# Patient Record
Sex: FEMALE | Race: BLACK OR AFRICAN AMERICAN | NOT HISPANIC OR LATINO | Employment: OTHER | URBAN - METROPOLITAN AREA
[De-identification: names, ages, dates, MRNs, and addresses within clinical notes are randomized per-mention and may not be internally consistent; named-entity substitution may affect disease eponyms.]

---

## 2022-11-09 ENCOUNTER — HOSPITAL ENCOUNTER (INPATIENT)
Facility: HOSPITAL | Age: 72
LOS: 8 days | Discharge: NURSING, REHAB OR LONG-TERM CARE | DRG: 481 | End: 2022-11-17
Attending: EMERGENCY MEDICINE | Admitting: FAMILY MEDICINE
Payer: MEDICARE

## 2022-11-09 DIAGNOSIS — Z01.818 PRE-OP EVALUATION: ICD-10-CM

## 2022-11-09 DIAGNOSIS — S52.502A CLOSED FRACTURE OF DISTAL END OF LEFT RADIUS, UNSPECIFIED FRACTURE MORPHOLOGY, INITIAL ENCOUNTER: ICD-10-CM

## 2022-11-09 DIAGNOSIS — S72.145A CLOSED NONDISPLACED INTERTROCHANTERIC FRACTURE OF LEFT FEMUR, INITIAL ENCOUNTER: ICD-10-CM

## 2022-11-09 DIAGNOSIS — Z01.818 PRE-OP TESTING: ICD-10-CM

## 2022-11-09 DIAGNOSIS — R07.9 CHEST PAIN: ICD-10-CM

## 2022-11-09 DIAGNOSIS — S72.145D CLOSED NONDISPLACED INTERTROCHANTERIC FRACTURE OF LEFT FEMUR WITH ROUTINE HEALING, SUBSEQUENT ENCOUNTER: ICD-10-CM

## 2022-11-09 DIAGNOSIS — S72.142D CLOSED DISPLACED INTERTROCHANTERIC FRACTURE OF LEFT FEMUR WITH ROUTINE HEALING, SUBSEQUENT ENCOUNTER: Primary | ICD-10-CM

## 2022-11-09 DIAGNOSIS — S72.002A CLOSED FRACTURE OF LEFT HIP, INITIAL ENCOUNTER: ICD-10-CM
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
## 2022-11-09 LAB
ALBUMIN SERPL BCP-MCNC: 4.1 G/DL (ref 3.5–5.2)
ALP SERPL-CCNC: 75 U/L (ref 55–135)
ALT SERPL W/O P-5'-P-CCNC: 8 U/L (ref 10–44)
ANION GAP SERPL CALC-SCNC: 12 MMOL/L (ref 8–16)
APTT BLDCRRT: 22 SEC (ref 21–32)
AST SERPL-CCNC: 19 U/L (ref 10–40)
BASOPHILS # BLD AUTO: 0.02 K/UL (ref 0–0.2)
BASOPHILS NFR BLD: 0.2 % (ref 0–1.9)
BILIRUB SERPL-MCNC: 0.5 MG/DL (ref 0.1–1)
BUN SERPL-MCNC: 11 MG/DL (ref 8–23)
CALCIUM SERPL-MCNC: 9.1 MG/DL (ref 8.7–10.5)
CHLORIDE SERPL-SCNC: 110 MMOL/L (ref 95–110)
CO2 SERPL-SCNC: 16 MMOL/L (ref 23–29)
CREAT SERPL-MCNC: 0.7 MG/DL (ref 0.5–1.4)
DIFFERENTIAL METHOD: ABNORMAL
EOSINOPHIL # BLD AUTO: 0 K/UL (ref 0–0.5)
EOSINOPHIL NFR BLD: 0 % (ref 0–8)
ERYTHROCYTE [DISTWIDTH] IN BLOOD BY AUTOMATED COUNT: 19.3 % (ref 11.5–14.5)
EST. GFR  (NO RACE VARIABLE): >60 ML/MIN/1.73 M^2
GLUCOSE SERPL-MCNC: 108 MG/DL (ref 70–110)
HCT VFR BLD AUTO: 29.7 % (ref 37–48.5)
HGB BLD-MCNC: 9.1 G/DL (ref 12–16)
IMM GRANULOCYTES # BLD AUTO: 0.03 K/UL (ref 0–0.04)
IMM GRANULOCYTES NFR BLD AUTO: 0.3 % (ref 0–0.5)
INR PPP: 1 (ref 0.8–1.2)
LYMPHOCYTES # BLD AUTO: 1.5 K/UL (ref 1–4.8)
LYMPHOCYTES NFR BLD: 13.5 % (ref 18–48)
MCH RBC QN AUTO: 22.1 PG (ref 27–31)
MCHC RBC AUTO-ENTMCNC: 30.6 G/DL (ref 32–36)
MCV RBC AUTO: 72 FL (ref 82–98)
MONOCYTES # BLD AUTO: 0.7 K/UL (ref 0.3–1)
MONOCYTES NFR BLD: 6.2 % (ref 4–15)
NEUTROPHILS # BLD AUTO: 8.6 K/UL (ref 1.8–7.7)
NEUTROPHILS NFR BLD: 79.8 % (ref 38–73)
NRBC BLD-RTO: 0 /100 WBC
PLATELET # BLD AUTO: 270 K/UL (ref 150–450)
PMV BLD AUTO: 11.4 FL (ref 9.2–12.9)
POTASSIUM SERPL-SCNC: 4.1 MMOL/L (ref 3.5–5.1)
PROT SERPL-MCNC: 7.1 G/DL (ref 6–8.4)
PROTHROMBIN TIME: 10.3 SEC (ref 9–12.5)
RBC # BLD AUTO: 4.12 M/UL (ref 4–5.4)
SODIUM SERPL-SCNC: 138 MMOL/L (ref 136–145)
WBC # BLD AUTO: 10.74 K/UL (ref 3.9–12.7)

## 2022-11-09 PROCEDURE — 25000003 PHARM REV CODE 250: Performed by: FAMILY MEDICINE

## 2022-11-09 PROCEDURE — 85025 COMPLETE CBC W/AUTO DIFF WBC: CPT | Performed by: EMERGENCY MEDICINE

## 2022-11-09 PROCEDURE — 93010 ELECTROCARDIOGRAM REPORT: CPT | Mod: ,,, | Performed by: INTERNAL MEDICINE

## 2022-11-09 PROCEDURE — 11000001 HC ACUTE MED/SURG PRIVATE ROOM

## 2022-11-09 PROCEDURE — 85610 PROTHROMBIN TIME: CPT | Performed by: FAMILY MEDICINE

## 2022-11-09 PROCEDURE — 63600175 PHARM REV CODE 636 W HCPCS: Performed by: EMERGENCY MEDICINE

## 2022-11-09 PROCEDURE — 80053 COMPREHEN METABOLIC PANEL: CPT | Performed by: EMERGENCY MEDICINE

## 2022-11-09 PROCEDURE — 93010 EKG 12-LEAD: ICD-10-PCS | Mod: ,,, | Performed by: INTERNAL MEDICINE

## 2022-11-09 PROCEDURE — P9612 CATHETERIZE FOR URINE SPEC: HCPCS

## 2022-11-09 PROCEDURE — 99285 EMERGENCY DEPT VISIT HI MDM: CPT | Mod: 25

## 2022-11-09 PROCEDURE — 93005 ELECTROCARDIOGRAM TRACING: CPT

## 2022-11-09 PROCEDURE — 85730 THROMBOPLASTIN TIME PARTIAL: CPT | Performed by: FAMILY MEDICINE

## 2022-11-09 PROCEDURE — 86850 RBC ANTIBODY SCREEN: CPT | Performed by: ORTHOPAEDIC SURGERY

## 2022-11-09 PROCEDURE — 25000003 PHARM REV CODE 250: Performed by: EMERGENCY MEDICINE

## 2022-11-09 RX ORDER — HYDROCODONE BITARTRATE AND ACETAMINOPHEN 5; 325 MG/1; MG/1
1 TABLET ORAL
Status: COMPLETED | OUTPATIENT
Start: 2022-11-09 | End: 2022-11-09

## 2022-11-09 RX ORDER — SODIUM CHLORIDE 9 MG/ML
INJECTION, SOLUTION INTRAVENOUS CONTINUOUS
Status: DISCONTINUED | OUTPATIENT
Start: 2022-11-09 | End: 2022-11-14

## 2022-11-09 RX ORDER — ACETAMINOPHEN 325 MG/1
650 TABLET ORAL EVERY 6 HOURS PRN
Status: DISCONTINUED | OUTPATIENT
Start: 2022-11-09 | End: 2022-11-10

## 2022-11-09 RX ORDER — MORPHINE SULFATE 4 MG/ML
4 INJECTION, SOLUTION INTRAMUSCULAR; INTRAVENOUS EVERY 6 HOURS PRN
Status: DISCONTINUED | OUTPATIENT
Start: 2022-11-09 | End: 2022-11-10

## 2022-11-09 RX ORDER — HYDROCODONE BITARTRATE AND ACETAMINOPHEN 7.5; 325 MG/1; MG/1
1 TABLET ORAL EVERY 6 HOURS PRN
Status: DISCONTINUED | OUTPATIENT
Start: 2022-11-09 | End: 2022-11-10

## 2022-11-09 RX ORDER — MORPHINE SULFATE 4 MG/ML
4 INJECTION, SOLUTION INTRAMUSCULAR; INTRAVENOUS
Status: COMPLETED | OUTPATIENT
Start: 2022-11-09 | End: 2022-11-09

## 2022-11-09 RX ORDER — POLYETHYLENE GLYCOL 3350 17 G/17G
17 POWDER, FOR SOLUTION ORAL 2 TIMES DAILY
Status: DISCONTINUED | OUTPATIENT
Start: 2022-11-09 | End: 2022-11-18 | Stop reason: HOSPADM

## 2022-11-09 RX ADMIN — SODIUM CHLORIDE: 0.9 INJECTION, SOLUTION INTRAVENOUS at 11:11

## 2022-11-09 RX ADMIN — POLYETHYLENE GLYCOL 3350 17 G: 17 POWDER, FOR SOLUTION ORAL at 11:11

## 2022-11-09 RX ADMIN — MORPHINE SULFATE 4 MG: 4 INJECTION INTRAVENOUS at 10:11

## 2022-11-09 RX ADMIN — HYDROCODONE BITARTRATE AND ACETAMINOPHEN 1 TABLET: 5; 325 TABLET ORAL at 09:11

## 2022-11-10 ENCOUNTER — ANESTHESIA EVENT (OUTPATIENT)
Dept: SURGERY | Facility: HOSPITAL | Age: 72
DRG: 481 | End: 2022-11-10
Payer: MEDICARE

## 2022-11-10 ENCOUNTER — ANESTHESIA (OUTPATIENT)
Dept: SURGERY | Facility: HOSPITAL | Age: 72
DRG: 481 | End: 2022-11-10
Payer: MEDICARE

## 2022-11-10 PROBLEM — F41.1 GAD (GENERALIZED ANXIETY DISORDER): Status: ACTIVE | Noted: 2022-11-10

## 2022-11-10 PROBLEM — S52.502A CLOSED FRACTURE OF DISTAL END OF LEFT RADIUS: Status: ACTIVE | Noted: 2022-11-10

## 2022-11-10 PROBLEM — S72.145A CLOSED NONDISPLACED INTERTROCHANTERIC FRACTURE OF LEFT FEMUR: Status: ACTIVE | Noted: 2022-11-10

## 2022-11-10 LAB
ABO + RH BLD: NORMAL
BILIRUB UR QL STRIP: NEGATIVE
BLD GP AB SCN CELLS X3 SERPL QL: NORMAL
CLARITY UR: CLEAR
COLOR UR: YELLOW
GLUCOSE UR QL STRIP: NEGATIVE
HGB UR QL STRIP: NEGATIVE
KETONES UR QL STRIP: ABNORMAL
LEUKOCYTE ESTERASE UR QL STRIP: NEGATIVE
NITRITE UR QL STRIP: NEGATIVE
PH UR STRIP: 6 [PH] (ref 5–8)
PROT UR QL STRIP: NEGATIVE
SP GR UR STRIP: 1.02 (ref 1–1.03)
URN SPEC COLLECT METH UR: ABNORMAL
UROBILINOGEN UR STRIP-ACNC: NEGATIVE EU/DL

## 2022-11-10 PROCEDURE — 25000003 PHARM REV CODE 250: Performed by: NURSE PRACTITIONER

## 2022-11-10 PROCEDURE — 25000003 PHARM REV CODE 250: Performed by: FAMILY MEDICINE

## 2022-11-10 PROCEDURE — 36000710: Performed by: ORTHOPAEDIC SURGERY

## 2022-11-10 PROCEDURE — 99223 PR INITIAL HOSPITAL CARE,LEVL III: ICD-10-PCS | Mod: 57,,, | Performed by: PHYSICIAN ASSISTANT

## 2022-11-10 PROCEDURE — 71000039 HC RECOVERY, EACH ADD'L HOUR: Performed by: ORTHOPAEDIC SURGERY

## 2022-11-10 PROCEDURE — 36415 COLL VENOUS BLD VENIPUNCTURE: CPT | Performed by: FAMILY MEDICINE

## 2022-11-10 PROCEDURE — 25000003 PHARM REV CODE 250: Performed by: ORTHOPAEDIC SURGERY

## 2022-11-10 PROCEDURE — 27245 TREAT THIGH FRACTURE: CPT | Mod: LT,,, | Performed by: ORTHOPAEDIC SURGERY

## 2022-11-10 PROCEDURE — 99223 1ST HOSP IP/OBS HIGH 75: CPT | Mod: 57,,, | Performed by: PHYSICIAN ASSISTANT

## 2022-11-10 PROCEDURE — 21400001 HC TELEMETRY ROOM

## 2022-11-10 PROCEDURE — 25000003 PHARM REV CODE 250: Performed by: STUDENT IN AN ORGANIZED HEALTH CARE EDUCATION/TRAINING PROGRAM

## 2022-11-10 PROCEDURE — 27245 TREAT THIGH FRACTURE: CPT | Mod: AS,LT,, | Performed by: PHYSICIAN ASSISTANT

## 2022-11-10 PROCEDURE — 71000033 HC RECOVERY, INTIAL HOUR: Performed by: ORTHOPAEDIC SURGERY

## 2022-11-10 PROCEDURE — 63600175 PHARM REV CODE 636 W HCPCS: Performed by: PHYSICIAN ASSISTANT

## 2022-11-10 PROCEDURE — 27201423 OPTIME MED/SURG SUP & DEVICES STERILE SUPPLY: Performed by: ORTHOPAEDIC SURGERY

## 2022-11-10 PROCEDURE — 81003 URINALYSIS AUTO W/O SCOPE: CPT | Performed by: EMERGENCY MEDICINE

## 2022-11-10 PROCEDURE — 37000009 HC ANESTHESIA EA ADD 15 MINS: Performed by: ORTHOPAEDIC SURGERY

## 2022-11-10 PROCEDURE — 27245 PR OPEN FIX INTER/SUBTROCH FX,IMPLNT: ICD-10-PCS | Mod: AS,LT,, | Performed by: PHYSICIAN ASSISTANT

## 2022-11-10 PROCEDURE — C1713 ANCHOR/SCREW BN/BN,TIS/BN: HCPCS | Performed by: ORTHOPAEDIC SURGERY

## 2022-11-10 PROCEDURE — 63600175 PHARM REV CODE 636 W HCPCS: Performed by: FAMILY MEDICINE

## 2022-11-10 PROCEDURE — 27245 PR OPEN FIX INTER/SUBTROCH FX,IMPLNT: ICD-10-PCS | Mod: LT,,, | Performed by: ORTHOPAEDIC SURGERY

## 2022-11-10 PROCEDURE — 25000003 PHARM REV CODE 250: Performed by: PHYSICIAN ASSISTANT

## 2022-11-10 PROCEDURE — C1769 GUIDE WIRE: HCPCS | Performed by: ORTHOPAEDIC SURGERY

## 2022-11-10 PROCEDURE — 63600175 PHARM REV CODE 636 W HCPCS: Performed by: NURSE ANESTHETIST, CERTIFIED REGISTERED

## 2022-11-10 PROCEDURE — 25000003 PHARM REV CODE 250: Performed by: NURSE ANESTHETIST, CERTIFIED REGISTERED

## 2022-11-10 PROCEDURE — 37000008 HC ANESTHESIA 1ST 15 MINUTES: Performed by: ORTHOPAEDIC SURGERY

## 2022-11-10 PROCEDURE — 36000711: Performed by: ORTHOPAEDIC SURGERY

## 2022-11-10 PROCEDURE — 63600175 PHARM REV CODE 636 W HCPCS: Performed by: NURSE PRACTITIONER

## 2022-11-10 RX ORDER — MORPHINE SULFATE 4 MG/ML
4 INJECTION, SOLUTION INTRAMUSCULAR; INTRAVENOUS
Status: DISCONTINUED | OUTPATIENT
Start: 2022-11-10 | End: 2022-11-10

## 2022-11-10 RX ORDER — BUSPIRONE HYDROCHLORIDE 10 MG/1
10 TABLET ORAL 3 TIMES DAILY
Status: DISCONTINUED | OUTPATIENT
Start: 2022-11-10 | End: 2022-11-18 | Stop reason: HOSPADM

## 2022-11-10 RX ORDER — SODIUM CHLORIDE 0.9 % (FLUSH) 0.9 %
10 SYRINGE (ML) INJECTION
Status: DISCONTINUED | OUTPATIENT
Start: 2022-11-10 | End: 2022-11-18 | Stop reason: HOSPADM

## 2022-11-10 RX ORDER — SODIUM,POTASSIUM PHOSPHATES 280-250MG
2 POWDER IN PACKET (EA) ORAL
Status: DISCONTINUED | OUTPATIENT
Start: 2022-11-10 | End: 2022-11-18 | Stop reason: HOSPADM

## 2022-11-10 RX ORDER — OXYCODONE AND ACETAMINOPHEN 5; 325 MG/1; MG/1
1 TABLET ORAL
Status: DISCONTINUED | OUTPATIENT
Start: 2022-11-10 | End: 2022-11-10 | Stop reason: HOSPADM

## 2022-11-10 RX ORDER — SUCCINYLCHOLINE CHLORIDE 20 MG/ML
INJECTION INTRAMUSCULAR; INTRAVENOUS
Status: DISCONTINUED | OUTPATIENT
Start: 2022-11-10 | End: 2022-11-10

## 2022-11-10 RX ORDER — ACETAMINOPHEN 325 MG/1
650 TABLET ORAL EVERY 4 HOURS PRN
Status: DISCONTINUED | OUTPATIENT
Start: 2022-11-10 | End: 2022-11-18 | Stop reason: HOSPADM

## 2022-11-10 RX ORDER — ROCURONIUM BROMIDE 10 MG/ML
INJECTION, SOLUTION INTRAVENOUS
Status: DISCONTINUED | OUTPATIENT
Start: 2022-11-10 | End: 2022-11-10

## 2022-11-10 RX ORDER — KETOROLAC TROMETHAMINE 30 MG/ML
15 INJECTION, SOLUTION INTRAMUSCULAR; INTRAVENOUS EVERY 8 HOURS PRN
Status: DISCONTINUED | OUTPATIENT
Start: 2022-11-10 | End: 2022-11-10 | Stop reason: HOSPADM

## 2022-11-10 RX ORDER — HYDROCODONE BITARTRATE AND ACETAMINOPHEN 5; 325 MG/1; MG/1
1 TABLET ORAL EVERY 4 HOURS PRN
Status: DISCONTINUED | OUTPATIENT
Start: 2022-11-10 | End: 2022-11-18 | Stop reason: HOSPADM

## 2022-11-10 RX ORDER — ONDANSETRON 8 MG/1
8 TABLET, ORALLY DISINTEGRATING ORAL EVERY 8 HOURS PRN
Status: DISCONTINUED | OUTPATIENT
Start: 2022-11-10 | End: 2022-11-18 | Stop reason: HOSPADM

## 2022-11-10 RX ORDER — MUPIROCIN 20 MG/G
OINTMENT TOPICAL 2 TIMES DAILY
Status: COMPLETED | OUTPATIENT
Start: 2022-11-10 | End: 2022-11-15

## 2022-11-10 RX ORDER — MIRTAZAPINE 30 MG/1
30 TABLET, FILM COATED ORAL NIGHTLY
COMMUNITY
Start: 2022-06-23 | End: 2023-01-09 | Stop reason: SDUPTHER

## 2022-11-10 RX ORDER — MORPHINE SULFATE 4 MG/ML
2 INJECTION, SOLUTION INTRAMUSCULAR; INTRAVENOUS
Status: DISCONTINUED | OUTPATIENT
Start: 2022-11-10 | End: 2022-11-18 | Stop reason: HOSPADM

## 2022-11-10 RX ORDER — DIPHENHYDRAMINE HCL 25 MG
25 CAPSULE ORAL EVERY 6 HOURS PRN
Status: DISCONTINUED | OUTPATIENT
Start: 2022-11-10 | End: 2022-11-18 | Stop reason: HOSPADM

## 2022-11-10 RX ORDER — MAG HYDROX/ALUMINUM HYD/SIMETH 200-200-20
30 SUSPENSION, ORAL (FINAL DOSE FORM) ORAL 4 TIMES DAILY PRN
Status: DISCONTINUED | OUTPATIENT
Start: 2022-11-10 | End: 2022-11-18 | Stop reason: HOSPADM

## 2022-11-10 RX ORDER — PROMETHAZINE HYDROCHLORIDE 25 MG/1
25 TABLET ORAL EVERY 6 HOURS PRN
Status: DISCONTINUED | OUTPATIENT
Start: 2022-11-10 | End: 2022-11-18 | Stop reason: HOSPADM

## 2022-11-10 RX ORDER — LANOLIN ALCOHOL/MO/W.PET/CERES
800 CREAM (GRAM) TOPICAL
Status: DISCONTINUED | OUTPATIENT
Start: 2022-11-10 | End: 2022-11-18 | Stop reason: HOSPADM

## 2022-11-10 RX ORDER — BUSPIRONE HYDROCHLORIDE 10 MG/1
10 TABLET ORAL 3 TIMES DAILY
COMMUNITY
Start: 2022-06-23 | End: 2023-01-09 | Stop reason: SDUPTHER

## 2022-11-10 RX ORDER — ROPIVACAINE/EPI/CLONIDINE/KET 2.46-0.005
SYRINGE (ML) INJECTION
Status: DISCONTINUED | OUTPATIENT
Start: 2022-11-10 | End: 2022-11-10 | Stop reason: HOSPADM

## 2022-11-10 RX ORDER — NAPROXEN SODIUM 220 MG/1
81 TABLET, FILM COATED ORAL 2 TIMES DAILY
Status: DISCONTINUED | OUTPATIENT
Start: 2022-11-11 | End: 2022-11-18 | Stop reason: HOSPADM

## 2022-11-10 RX ORDER — LIDOCAINE HYDROCHLORIDE 20 MG/ML
INJECTION INTRAVENOUS
Status: DISCONTINUED | OUTPATIENT
Start: 2022-11-10 | End: 2022-11-10

## 2022-11-10 RX ORDER — DEXAMETHASONE SODIUM PHOSPHATE 4 MG/ML
INJECTION, SOLUTION INTRA-ARTICULAR; INTRALESIONAL; INTRAMUSCULAR; INTRAVENOUS; SOFT TISSUE
Status: DISCONTINUED | OUTPATIENT
Start: 2022-11-10 | End: 2022-11-10

## 2022-11-10 RX ORDER — CEFAZOLIN SODIUM 1 G/50ML
1 SOLUTION INTRAVENOUS
Status: DISCONTINUED | OUTPATIENT
Start: 2022-11-10 | End: 2022-11-10

## 2022-11-10 RX ORDER — TRANEXAMIC ACID 100 MG/ML
1000 INJECTION, SOLUTION INTRAVENOUS ONCE
Status: COMPLETED | OUTPATIENT
Start: 2022-11-10 | End: 2022-11-10

## 2022-11-10 RX ORDER — CEFAZOLIN SODIUM 2 G/50ML
2 SOLUTION INTRAVENOUS
Status: DISCONTINUED | OUTPATIENT
Start: 2022-11-11 | End: 2022-11-12

## 2022-11-10 RX ORDER — NALOXONE HCL 0.4 MG/ML
0.02 VIAL (ML) INJECTION
Status: DISCONTINUED | OUTPATIENT
Start: 2022-11-10 | End: 2022-11-18 | Stop reason: HOSPADM

## 2022-11-10 RX ORDER — ONDANSETRON 2 MG/ML
4 INJECTION INTRAMUSCULAR; INTRAVENOUS EVERY 6 HOURS PRN
Status: DISCONTINUED | OUTPATIENT
Start: 2022-11-10 | End: 2022-11-10

## 2022-11-10 RX ORDER — CHLORHEXIDINE GLUCONATE ORAL RINSE 1.2 MG/ML
10 SOLUTION DENTAL 2 TIMES DAILY
Status: COMPLETED | OUTPATIENT
Start: 2022-11-10 | End: 2022-11-15

## 2022-11-10 RX ORDER — HYDROMORPHONE HYDROCHLORIDE 2 MG/ML
0.2 INJECTION, SOLUTION INTRAMUSCULAR; INTRAVENOUS; SUBCUTANEOUS EVERY 5 MIN PRN
Status: DISCONTINUED | OUTPATIENT
Start: 2022-11-10 | End: 2022-11-10 | Stop reason: HOSPADM

## 2022-11-10 RX ORDER — GUAIFENESIN 100 MG/5ML
200 SOLUTION ORAL EVERY 4 HOURS PRN
Status: DISCONTINUED | OUTPATIENT
Start: 2022-11-10 | End: 2022-11-18 | Stop reason: HOSPADM

## 2022-11-10 RX ORDER — ONDANSETRON 2 MG/ML
4 INJECTION INTRAMUSCULAR; INTRAVENOUS DAILY PRN
Status: DISCONTINUED | OUTPATIENT
Start: 2022-11-10 | End: 2022-11-10 | Stop reason: HOSPADM

## 2022-11-10 RX ORDER — SIMETHICONE 80 MG
1 TABLET,CHEWABLE ORAL 4 TIMES DAILY PRN
Status: DISCONTINUED | OUTPATIENT
Start: 2022-11-10 | End: 2022-11-18 | Stop reason: HOSPADM

## 2022-11-10 RX ORDER — PROPOFOL 10 MG/ML
VIAL (ML) INTRAVENOUS
Status: DISCONTINUED | OUTPATIENT
Start: 2022-11-10 | End: 2022-11-10

## 2022-11-10 RX ORDER — FENTANYL CITRATE 50 UG/ML
INJECTION, SOLUTION INTRAMUSCULAR; INTRAVENOUS
Status: DISCONTINUED | OUTPATIENT
Start: 2022-11-10 | End: 2022-11-10

## 2022-11-10 RX ORDER — ONDANSETRON 2 MG/ML
INJECTION INTRAMUSCULAR; INTRAVENOUS
Status: DISCONTINUED | OUTPATIENT
Start: 2022-11-10 | End: 2022-11-10

## 2022-11-10 RX ORDER — MIRTAZAPINE 15 MG/1
30 TABLET, FILM COATED ORAL NIGHTLY
Status: DISCONTINUED | OUTPATIENT
Start: 2022-11-10 | End: 2022-11-18 | Stop reason: HOSPADM

## 2022-11-10 RX ORDER — PHENYLEPHRINE HYDROCHLORIDE 10 MG/ML
INJECTION INTRAVENOUS
Status: DISCONTINUED | OUTPATIENT
Start: 2022-11-10 | End: 2022-11-10

## 2022-11-10 RX ORDER — TALC
6 POWDER (GRAM) TOPICAL NIGHTLY PRN
Status: DISCONTINUED | OUTPATIENT
Start: 2022-11-10 | End: 2022-11-18 | Stop reason: HOSPADM

## 2022-11-10 RX ORDER — CEFAZOLIN SODIUM 1 G/3ML
INJECTION, POWDER, FOR SOLUTION INTRAMUSCULAR; INTRAVENOUS
Status: DISCONTINUED | OUTPATIENT
Start: 2022-11-10 | End: 2022-11-10

## 2022-11-10 RX ADMIN — ROCURONIUM BROMIDE 5 MG: 10 INJECTION, SOLUTION INTRAVENOUS at 01:11

## 2022-11-10 RX ADMIN — ONDANSETRON 4 MG: 2 INJECTION INTRAMUSCULAR; INTRAVENOUS at 05:11

## 2022-11-10 RX ADMIN — SODIUM CHLORIDE, SODIUM LACTATE, POTASSIUM CHLORIDE, AND CALCIUM CHLORIDE: .6; .31; .03; .02 INJECTION, SOLUTION INTRAVENOUS at 01:11

## 2022-11-10 RX ADMIN — MIRTAZAPINE 30 MG: 15 TABLET, FILM COATED ORAL at 08:11

## 2022-11-10 RX ADMIN — FENTANYL CITRATE 50 MCG: 50 INJECTION, SOLUTION INTRAMUSCULAR; INTRAVENOUS at 01:11

## 2022-11-10 RX ADMIN — FENTANYL CITRATE 25 MCG: 50 INJECTION, SOLUTION INTRAMUSCULAR; INTRAVENOUS at 02:11

## 2022-11-10 RX ADMIN — MORPHINE SULFATE 4 MG: 4 INJECTION INTRAVENOUS at 10:11

## 2022-11-10 RX ADMIN — ONDANSETRON 4 MG: 2 INJECTION, SOLUTION INTRAMUSCULAR; INTRAVENOUS at 01:11

## 2022-11-10 RX ADMIN — PROPOFOL 90 MG: 10 INJECTION, EMULSION INTRAVENOUS at 01:11

## 2022-11-10 RX ADMIN — DEXAMETHASONE SODIUM PHOSPHATE 4 MG: 4 INJECTION, SOLUTION INTRAMUSCULAR; INTRAVENOUS at 01:11

## 2022-11-10 RX ADMIN — BUSPIRONE HYDROCHLORIDE 10 MG: 10 TABLET ORAL at 08:11

## 2022-11-10 RX ADMIN — SUCCINYLCHOLINE CHLORIDE 80 MG: 20 INJECTION, SOLUTION INTRAMUSCULAR; INTRAVENOUS at 01:11

## 2022-11-10 RX ADMIN — TRANEXAMIC ACID 1000 MG: 100 INJECTION, SOLUTION INTRAVENOUS at 01:11

## 2022-11-10 RX ADMIN — SODIUM CHLORIDE: 0.9 INJECTION, SOLUTION INTRAVENOUS at 08:11

## 2022-11-10 RX ADMIN — CEFAZOLIN SODIUM 1 G: 1 SOLUTION INTRAVENOUS at 05:11

## 2022-11-10 RX ADMIN — MORPHINE SULFATE 4 MG: 4 INJECTION INTRAVENOUS at 05:11

## 2022-11-10 RX ADMIN — Medication 6 MG: at 08:11

## 2022-11-10 RX ADMIN — LIDOCAINE HYDROCHLORIDE 50 MG: 20 INJECTION, SOLUTION INTRAVENOUS at 01:11

## 2022-11-10 RX ADMIN — PHENYLEPHRINE HYDROCHLORIDE 200 MCG: 10 INJECTION INTRAVENOUS at 01:11

## 2022-11-10 RX ADMIN — PHENYLEPHRINE HYDROCHLORIDE 100 MCG: 10 INJECTION INTRAVENOUS at 01:11

## 2022-11-10 RX ADMIN — CEFAZOLIN 1 G: 1 INJECTION, POWDER, FOR SOLUTION INTRAMUSCULAR; INTRAVENOUS at 01:11

## 2022-11-10 RX ADMIN — BUSPIRONE HYDROCHLORIDE 10 MG: 10 TABLET ORAL at 05:11

## 2022-11-10 RX ADMIN — FENTANYL CITRATE 25 MCG: 50 INJECTION, SOLUTION INTRAMUSCULAR; INTRAVENOUS at 01:11

## 2022-11-10 RX ADMIN — SODIUM CHLORIDE: 0.9 INJECTION, SOLUTION INTRAVENOUS at 04:11

## 2022-11-10 RX ADMIN — MUPIROCIN: 20 OINTMENT TOPICAL at 08:11

## 2022-11-10 RX ADMIN — CHLORHEXIDINE GLUCONATE 0.12% ORAL RINSE 10 ML: 1.2 LIQUID ORAL at 08:11

## 2022-11-10 RX ADMIN — SODIUM CHLORIDE, SODIUM LACTATE, POTASSIUM CHLORIDE, AND CALCIUM CHLORIDE: .6; .31; .03; .02 INJECTION, SOLUTION INTRAVENOUS at 02:11

## 2022-11-10 RX ADMIN — POLYETHYLENE GLYCOL 3350 17 G: 17 POWDER, FOR SOLUTION ORAL at 08:11

## 2022-11-10 NOTE — ED PROVIDER NOTES
SCRIBE #1 NOTE: I, Pito Vila, am scribing for, and in the presence of, Matt Delgado MD. I have scribed the entire note.       History     Chief Complaint   Patient presents with    Fall     Pt had a slip/trip fall at home. C/o left upper leg pain and L wrist pain pt states she did hit her head but denies LOC +thinners     Review of patient's allergies indicates:  No Known Allergies      History of Present Illness     HPI    11/9/2022, 8:44 PM  History obtained from the patient      History of Present Illness: Ayde Felix is a 72 y.o. female patient who presents to the Emergency Department for evaluation of left wrist and LLE pain secondary to fall at 13:00. Pt states she was walking upstairs when she slipped and fell. Symptoms are constant and moderate in severity. No mitigating or exacerbating factors reported. No associated sxs reported. Patient denies any HA, n/v, dizziness, LOC, CP, SOB, fever, and all other sxs at this time. No prior tx reported. No further complaints or concerns at this time.       Arrival mode: Ambulance service    PCP: No primary care provider on file.        Past Medical History:  No past medical history on file.    Past Surgical History:  No past surgical history on file.      Family History:  No family history on file.    Social History:  Social History     Tobacco Use    Smoking status: Not on file    Smokeless tobacco: Not on file   Substance and Sexual Activity    Alcohol use: Not on file    Drug use: Not on file    Sexual activity: Not on file        Review of Systems     Review of Systems   Constitutional:  Negative for fever.   HENT:  Negative for sore throat.    Respiratory:  Negative for shortness of breath.    Cardiovascular:  Negative for chest pain.   Gastrointestinal:  Negative for nausea and vomiting.   Genitourinary:  Negative for dysuria.   Musculoskeletal:  Positive for arthralgias and myalgias. Negative for back pain.   Skin:  Negative for rash.   Neurological:   "Negative for syncope, weakness and headaches.   Hematological:  Does not bruise/bleed easily.   All other systems reviewed and are negative.   Physical Exam     Initial Vitals [11/09/22 1927]   BP Pulse Resp Temp SpO2   (!) 147/82 100 18 98.9 °F (37.2 °C) 99 %      MAP       --          Physical Exam  Nursing Notes and Vital Signs Reviewed.  Constitutional: Patient is in no acute distress. Well-developed and well-nourished.  Head: Atraumatic. Normocephalic.  Eyes: PERRL. EOM intact. Conjunctivae are not pale. No scleral icterus.  ENT: Mucous membranes are moist. Oropharynx is clear and symmetric.    Neck: Supple. Full ROM. No lymphadenopathy.  Cardiovascular: Regular rate. Regular rhythm. No murmurs, rubs, or gallops. Distal pulses are 2+ and symmetric.  Pulmonary/Chest: No respiratory distress. Clear to auscultation bilaterally. No wheezing or rales.  Abdominal: Soft and non-distended.  There is no tenderness.  No rebound, guarding, or rigidity. Good bowel sounds.  Genitourinary: No CVA tenderness  Musculoskeletal: Moves all extremities. No obvious deformities. Swelling of left wrist with normal ROM. Tenderness over the left lower thigh. No calf tenderness.  Skin: Warm and dry.  Neurological:  Alert, awake, and appropriate.  Normal speech.  No acute focal neurological deficits are appreciated.  Psychiatric: Normal affect. Good eye contact. Appropriate in content.     ED Course   Procedures  ED Vital Signs:  Vitals:    11/09/22 1927 11/09/22 2111 11/09/22 2255 11/10/22 0117   BP: (!) 147/82   (!) 156/67   Pulse: 100   76   Resp: 18 18 18 16   Temp: 98.9 °F (37.2 °C)      TempSrc: Oral      SpO2: 99%   96%   Weight: 47.6 kg (105 lb)      Height: 5' 7" (1.702 m)       11/10/22 0132   BP: (!) 150/72   Pulse: 75   Resp: 14   Temp:    TempSrc:    SpO2: 96%   Weight:    Height:        Abnormal Lab Results:  Labs Reviewed   CBC W/ AUTO DIFFERENTIAL - Abnormal; Notable for the following components:       Result Value    " Hemoglobin 9.1 (*)     Hematocrit 29.7 (*)     MCV 72 (*)     MCH 22.1 (*)     MCHC 30.6 (*)     RDW 19.3 (*)     Gran # (ANC) 8.6 (*)     Gran % 79.8 (*)     Lymph % 13.5 (*)     All other components within normal limits   COMPREHENSIVE METABOLIC PANEL - Abnormal; Notable for the following components:    CO2 16 (*)     ALT 8 (*)     All other components within normal limits   URINALYSIS - Abnormal; Notable for the following components:    Ketones, UA 1+ (*)     All other components within normal limits   PROTIME-INR   APTT   TYPE AND SCREEN PREOP        All Lab Results:  Results for orders placed or performed during the hospital encounter of 11/09/22   CBC auto differential   Result Value Ref Range    WBC 10.74 3.90 - 12.70 K/uL    RBC 4.12 4.00 - 5.40 M/uL    Hemoglobin 9.1 (L) 12.0 - 16.0 g/dL    Hematocrit 29.7 (L) 37.0 - 48.5 %    MCV 72 (L) 82 - 98 fL    MCH 22.1 (L) 27.0 - 31.0 pg    MCHC 30.6 (L) 32.0 - 36.0 g/dL    RDW 19.3 (H) 11.5 - 14.5 %    Platelets 270 150 - 450 K/uL    MPV 11.4 9.2 - 12.9 fL    Immature Granulocytes 0.3 0.0 - 0.5 %    Gran # (ANC) 8.6 (H) 1.8 - 7.7 K/uL    Immature Grans (Abs) 0.03 0.00 - 0.04 K/uL    Lymph # 1.5 1.0 - 4.8 K/uL    Mono # 0.7 0.3 - 1.0 K/uL    Eos # 0.0 0.0 - 0.5 K/uL    Baso # 0.02 0.00 - 0.20 K/uL    nRBC 0 0 /100 WBC    Gran % 79.8 (H) 38.0 - 73.0 %    Lymph % 13.5 (L) 18.0 - 48.0 %    Mono % 6.2 4.0 - 15.0 %    Eosinophil % 0.0 0.0 - 8.0 %    Basophil % 0.2 0.0 - 1.9 %    Differential Method Automated    Comprehensive metabolic panel   Result Value Ref Range    Sodium 138 136 - 145 mmol/L    Potassium 4.1 3.5 - 5.1 mmol/L    Chloride 110 95 - 110 mmol/L    CO2 16 (L) 23 - 29 mmol/L    Glucose 108 70 - 110 mg/dL    BUN 11 8 - 23 mg/dL    Creatinine 0.7 0.5 - 1.4 mg/dL    Calcium 9.1 8.7 - 10.5 mg/dL    Total Protein 7.1 6.0 - 8.4 g/dL    Albumin 4.1 3.5 - 5.2 g/dL    Total Bilirubin 0.5 0.1 - 1.0 mg/dL    Alkaline Phosphatase 75 55 - 135 U/L    AST 19 10 - 40 U/L     ALT 8 (L) 10 - 44 U/L    Anion Gap 12 8 - 16 mmol/L    eGFR >60 >60 mL/min/1.73 m^2   Urinalysis Catheterized   Result Value Ref Range    Specimen UA Urine, Catheterized     Color, UA Yellow Yellow, Straw, Alejandra    Appearance, UA Clear Clear    pH, UA 6.0 5.0 - 8.0    Specific Gravity, UA 1.020 1.005 - 1.030    Protein, UA Negative Negative    Glucose, UA Negative Negative    Ketones, UA 1+ (A) Negative    Bilirubin (UA) Negative Negative    Occult Blood UA Negative Negative    Nitrite, UA Negative Negative    Urobilinogen, UA Negative <2.0 EU/dL    Leukocytes, UA Negative Negative   Protime-INR   Result Value Ref Range    Prothrombin Time 10.3 9.0 - 12.5 sec    INR 1.0 0.8 - 1.2   APTT   Result Value Ref Range    aPTT 22.0 21.0 - 32.0 sec   Type And Screen Preop   Result Value Ref Range    Group & Rh O POS     Indirect [place] NEG        Imaging Results:  Imaging Results              X-Ray Chest AP Portable (Final result)  Result time 11/09/22 22:17:12      Final result by Angelo Aj MD (11/09/22 22:17:12)                   Impression:      No acute abnormality.      Electronically signed by: Jean Marie Stephens  Date:    11/09/2022  Time:    22:17               Narrative:    EXAMINATION:  XR CHEST AP PORTABLE    CLINICAL HISTORY:  Encounter for other preprocedural examination    TECHNIQUE:  Single frontal view of the chest was performed.    COMPARISON:  None    FINDINGS:  Mild interstitial prominence may relate to senescent changes.The lungs are otherwise clear, with normal appearance of pulmonary vasculature and no pleural effusion or pneumothorax.    The cardiac silhouette is normal in size. The hilar and mediastinal contours are unremarkable.    Bones are intact.                                       X-Ray Femur Ap/Lat Left (Final result)  Result time 11/09/22 21:14:26      Final result by Angelo Aj MD (11/09/22 21:14:26)                   Impression:      As above      Electronically signed by: Jean Marie  Angelo  Date:    11/09/2022  Time:    21:14               Narrative:    EXAMINATION:  XR FEMUR 2 VIEW LEFT    CLINICAL HISTORY:  XR FEMUR 2 VIEW LEFTUnspecified fall, initial encounter    COMPARISON:  None    FINDINGS:  Multiple radiographic views  were obtained.    Suggestion of a nondisplaced left inter trochanteric fracture.  No other fracture or dislocation                                       X-Ray Pelvis Routine AP (Final result)  Result time 11/09/22 21:11:49      Final result by Angelo Aj MD (11/09/22 21:11:49)                   Impression:      As above      Electronically signed by: Jean Marie Stephens  Date:    11/09/2022  Time:    21:11               Narrative:    EXAMINATION:  XR PELVIS ROUTINE AP    CLINICAL HISTORY:  fall;    TECHNIQUE:  AP view of the pelvis was performed.    COMPARISON:  None.    FINDINGS:  Moderate amount of stool in the colon.  Linear lucency along the intertrochanteric region suggestive of a nondisplaced wall intertrochanteric fracture.  Correlate clinically.  Mild degenerative joint disease.                                       X-Ray Wrist Complete Left (Final result)  Result time 11/09/22 21:16:58      Final result by Angelo Aj MD (11/09/22 21:16:58)                   Impression:      As above      Electronically signed by: Jean Marie Stephens  Date:    11/09/2022  Time:    21:16               Narrative:    EXAMINATION:  XR WRIST COMPLETE 3 VIEWS LEFT    CLINICAL HISTORY:  Unspecified fall, initial encounter    TECHNIQUE:  PA, lateral, and oblique views of the left wrist were performed.    COMPARISON:  None    FINDINGS:  Cortical fracture of the distal radius along the radial side and dorsal side.  Slight cortical irregularity of the lunate bone adjacent to the radioulnar articulation of uncertain chronicity.  The lunate and triquetrum appear to be fused.                                    The EKG was ordered, reviewed, and independently interpreted by the ED  provider.  Interpretation time: 22:14  Rate: 88 BPM  Rhythm: normal sinus rhythm  Interpretation: No acute ST changes. No STEMI.         The Emergency Provider reviewed the vital signs and test results, which are outlined above.     ED Discussion     9:43 PM: Discussed pt's case with Dr. Lynn (Orthopedic surgery) who recommends surgical intervention to repair minimally displaced left intertrochanteric fracture. Additionally recommends admission to  with ortho consult and NPO after midnight.    10:03 PM: Discussed case with Jenaro William MD ([facility]). Dr. William agrees with current care and management of pt and accepts admission.   Admitting Service: [facility]  Admitting Physician: Dr. William  Admit to: Inpatient med surg    10:03 PM: Re-evaluated pt. I have discussed test results, shared treatment plan, and the need for admission with patient and family at bedside. Pt and family express understanding at this time and agree with all information. All questions answered. Pt and family have no further questions or concerns at this time. Pt is ready for admit.     Medical Decision Making:   Clinical Tests:   Lab Tests: Ordered and Reviewed  Radiological Study: Reviewed and Ordered  Medical Tests: Ordered and Reviewed         ED Medication(s):  Medications   0.9%  NaCl infusion ( Intravenous New Bag 11/9/22 2323)   acetaminophen tablet 650 mg (has no administration in time range)   HYDROcodone-acetaminophen 7.5-325 mg per tablet 1 tablet (has no administration in time range)   morphine injection 4 mg (has no administration in time range)   polyethylene glycol packet 17 g (17 g Oral Given 11/9/22 2323)   busPIRone tablet 10 mg (has no administration in time range)   mirtazapine tablet 30 mg (has no administration in time range)   ondansetron injection 4 mg (has no administration in time range)   HYDROcodone-acetaminophen 5-325 mg per tablet 1 tablet (1 tablet Oral Given 11/9/22 2111)   morphine injection 4 mg (4 mg  Intravenous Given 11/9/22 8472)       New Prescriptions    No medications on file               Scribe Attestation:   Scribe #1: I performed the above scribed service and the documentation accurately describes the services I performed. I attest to the accuracy of the note.     Attending:   Physician Attestation Statement for Scribe #1: I, Matt Delgado MD, personally performed the services described in this documentation, as scribed by Pito Vila, in my presence, and it is both accurate and complete.           Clinical Impression       ICD-10-CM ICD-9-CM   1. Closed displaced intertrochanteric fracture of left femur with routine healing, subsequent encounter  S72.142D V54.13   2. Fall  W19.XXXA E888.9   3. Pre-op testing  Z01.818 V72.84   4. Pre-op evaluation  Z01.818 V72.84   5. Closed fracture of left hip, initial encounter  S72.002A 820.8   6. Closed fracture of distal end of left radius, unspecified fracture morphology, initial encounter  S52.502A 813.42       Disposition:   Disposition: Admitted  Condition: Fair       Matt Delgado MD  11/10/22 6779

## 2022-11-10 NOTE — ASSESSMENT & PLAN NOTE
Left intertrochanteric fracture on xray  Ortho notified  Plans for surgery in am  Npo  Pain control

## 2022-11-10 NOTE — PHARMACY MED REC
"Admission Medication History     The home medication history was taken by Luis Angel Guzman.    You may go to "Admission" then "Reconcile Home Medications" tabs to review and/or act upon these items.     The home medication list has been updated by the Pharmacy department.   Please read ALL comments highlighted in yellow.   Please address this information as you see fit.    Feel free to contact us if you have any questions or require assistance.      Medications listed below were obtained from: Patient/family and Analytic software- [employer]  (Not in a hospital admission)    Luis Angel Guzman  [id number]    Current Outpatient Medications on File Prior to Encounter   Medication Sig Dispense Refill Last Dose    busPIRone (BUSPAR) 10 MG tablet Take 10 mg by mouth 3 (three) times daily.   11/9/2022    REMERON 30 mg tablet Take 30 mg by mouth every evening.   11/9/2022                           .        "

## 2022-11-10 NOTE — TRANSFER OF CARE
"Anesthesia Transfer of Care Note    Patient: Ayde Felix    Procedure(s) Performed: Procedure(s) (LRB):  ORIF, FRACTURE, FEMUR (Left)    Patient location: PACU    Anesthesia Type: general    Transport from OR: Transported from OR on room air with adequate spontaneous ventilation    Post pain: adequate analgesia    Post assessment: no apparent anesthetic complications    Post vital signs: stable    Level of consciousness: responds to stimulation    Nausea/Vomiting: no nausea/vomiting    Complications: none    Transfer of care protocol was followed      Last vitals:   Visit Vitals  BP (!) 152/62   Pulse 78   Temp 37 °C (98.6 °F) (Oral)   Resp 18   Ht 5' 7" (1.702 m)   Wt 47.6 kg (105 lb)   SpO2 96%   Breastfeeding No   BMI 16.45 kg/m²     "

## 2022-11-10 NOTE — ANESTHESIA PREPROCEDURE EVALUATION
11/10/2022  Ayde Felix is a 72 y.o., female past medical history significant for generalized anxiety disorder who is in the emergency department following a trip and fall at home just prior to arrival.  Patient suffered a left intertrochanteric femur fracture and a left distal radius fracture. Patient denies loss of consciousness.  Patient denies use of blood thinners.    Patient Active Problem List   Diagnosis    Closed nondisplaced intertrochanteric fracture of left femur    Closed fracture of distal end of left radius    CHARO (generalized anxiety disorder)     No past medical history on file.    No past surgical history on file.      Pre-op Assessment    I have reviewed the Patient Summary Reports.    I have reviewed the NPO Status.   I have reviewed the Medications.     Review of Systems  Anesthesia Hx:  No previous Anesthesia  Neg history of prior surgery. Denies Family Hx of Anesthesia complications.    Social:  Smoker 0.5 ppd for > 50 yrs    *denies any recent URI sx's   Hematology/Oncology:     Oncology Normal    -- Anemia:   Cardiovascular:  Cardiovascular Normal  ECG has been reviewed. NSR   Pulmonary:  Pulmonary Normal    Renal/:  Renal/ Normal     Musculoskeletal:  Musculoskeletal Normal    Neurological:  Neurology Normal    Endocrine:  Endocrine Normal    Psych:   anxiety          Physical Exam  General: Cooperative, Alert, Oriented and Cachexia    Airway:  Mallampati: III   Mouth Opening: Normal  TM Distance: Normal  Tongue: Large  Neck ROM: Normal ROM    Dental:  Dentures, Edentulous  Dentures removed   Chest/Lungs:  Clear to auscultation, Normal Respiratory Rate        Chemistry        Component Value Date/Time     11/09/2022 2222    K 4.1 11/09/2022 2222     11/09/2022 2222    CO2 16 (L) 11/09/2022 2222    BUN 11 11/09/2022 2222    CREATININE 0.7 11/09/2022 2222    GLU  108 11/09/2022 2222        Component Value Date/Time    CALCIUM 9.1 11/09/2022 2222    ALKPHOS 75 11/09/2022 2222    AST 19 11/09/2022 2222    ALT 8 (L) 11/09/2022 2222    BILITOT 0.5 11/09/2022 2222        Lab Results   Component Value Date    WBC 10.74 11/09/2022    HGB 9.1 (L) 11/09/2022    HCT 29.7 (L) 11/09/2022    MCV 72 (L) 11/09/2022     11/09/2022           Anesthesia Plan  Type of Anesthesia, risks & benefits discussed:    Anesthesia Type: Gen ETT  Intra-op Monitoring Plan: Standard ASA Monitors  Post Op Pain Control Plan: multimodal analgesia and IV/PO Opioids PRN  Induction:  IV  Airway Plan: Direct  Informed Consent: Informed consent signed with the Patient and all parties understand the risks and agree with anesthesia plan.  All questions answered.   ASA Score: 2    Ready For Surgery From Anesthesia Perspective.     .

## 2022-11-10 NOTE — OP NOTE
Certification of Assistant at Surgery       Surgery Date: 11/10/2022     Participating Surgeons:  Surgeon(s) and Role:     * Deni Lynn MD - Primary    Procedures:  Procedure(s) (LRB):  ORIF, FRACTURE, FEMUR (Left)    Assistant Surgeon's Certification of Necessity:  I understand that section 1842 (b) (6) (d) of the Social Security Act generally prohibits Medicare Part B reasonable charge payment for the services of assistants at surgery in teaching hospitals when qualified residents are available to furnish such services. I certify that the services for which payment is claimed were medically necessary, and that no qualified resident was available to perform the services. I further understand that these services are subject to post-payment review by the Medicare carrier.      Brandon Toro PA-C  **  11/10/2022  2:22 PM

## 2022-11-10 NOTE — H&P
[facility] - Emergency Dept.  [facility] Medicine  History & Physical    Patient Name: Ayde Felix  MRN: [id number]  Patient Class: IP- Inpatient  Admission Date: 11/9/2022  Attending Physician: Jenaro William MD  Primary Care Provider: No primary care provider on file.         Patient information was obtained from patient and ER records.     Subjective:     Principal Problem:Closed nondisplaced intertrochanteric fracture of left femur    Chief Complaint:   Chief Complaint   Patient presents with    Fall     Pt had a slip/trip fall at home. C/o left upper leg pain and L wrist pain pt states she did hit her head but denies LOC +thinners        HPI: Patient is a 72 y.o. aa female with a PMH of CHARO who presents to the Emergency Department for evaluation of left wrist and LLE pain secondary to fall at 13:00. Patient suffered a fall when she was walking upstairs. Currently complains of left wrist and left hip pain however improving. Denies any other issues.     In the ED, xrays revealed left distal radial fracture and left intertrochanteric fracture. Labs showed an h/h 9.1/29.7 otherwise unremarkable. Ortho consulted who recommended admission to  and plans for surgery in the am.       No past medical history on file.    No past surgical history on file.    Review of patient's allergies indicates:  No Known Allergies    No current facility-administered medications on file prior to encounter.     Current Outpatient Medications on File Prior to Encounter   Medication Sig    busPIRone (BUSPAR) 10 MG tablet Take 10 mg by mouth 3 (three) times daily.    REMERON 30 mg tablet Take 30 mg by mouth every evening.     Family History    None       Tobacco Use    Smoking status: Not on file    Smokeless tobacco: Not on file   Substance and Sexual Activity    Alcohol use: Not on file    Drug use: Not on file    Sexual activity: Not on file     Review of Systems   Constitutional:  Negative for fatigue and fever.   HENT:  Negative for  sinus pressure.    Eyes:  Negative for visual disturbance.   Respiratory:  Negative for shortness of breath.    Cardiovascular:  Negative for chest pain.   Gastrointestinal:  Negative for nausea and vomiting.   Genitourinary:  Negative for difficulty urinating.   Musculoskeletal:  Negative for back pain.        Wrist pain, hip pain     Skin:  Negative for rash.   Neurological:  Negative for headaches.   Psychiatric/Behavioral:  Negative for confusion.    Objective:     Vital Signs (Most Recent):  Temp: 98.9 °F (37.2 °C) (11/09/22 1927)  Pulse: 75 (11/10/22 0132)  Resp: 14 (11/10/22 0132)  BP: (!) 150/72 (11/10/22 0132)  SpO2: 96 % (11/10/22 0132)   Vital Signs (24h Range):  Temp:  [98.9 °F (37.2 °C)] 98.9 °F (37.2 °C)  Pulse:  [] 75  Resp:  [14-18] 14  SpO2:  [96 %-99 %] 96 %  BP: (147-156)/(67-82) 150/72     Weight: 47.6 kg (105 lb)  Body mass index is 16.45 kg/m².    Physical Exam  Constitutional:       General: She is not in acute distress.     Appearance: She is well-developed. She is not diaphoretic.   HENT:      Head: Normocephalic and atraumatic.   Eyes:      Pupils: Pupils are equal, round, and reactive to light.   Cardiovascular:      Rate and Rhythm: Normal rate and regular rhythm.      Heart sounds: Normal heart sounds. No murmur heard.    No friction rub. No gallop.   Pulmonary:      Effort: Pulmonary effort is normal. No respiratory distress.      Breath sounds: Normal breath sounds. No stridor. No wheezing or rales.   Abdominal:      General: Bowel sounds are normal. There is no distension.      Palpations: Abdomen is soft. There is no mass.      Tenderness: There is no abdominal tenderness. There is no guarding.   Musculoskeletal:      Comments: Left arm in splint   Skin:     General: Skin is warm.      Findings: No erythema.   Neurological:      Mental Status: She is alert and oriented to person, place, and time.         CRANIAL NERVES     CN III, IV, VI   Pupils are equal, round, and reactive  to light.     Significant Labs:   Results for orders placed or performed during the hospital encounter of 11/09/22   CBC auto differential   Result Value Ref Range    WBC 10.74 3.90 - 12.70 K/uL    RBC 4.12 4.00 - 5.40 M/uL    Hemoglobin 9.1 (L) 12.0 - 16.0 g/dL    Hematocrit 29.7 (L) 37.0 - 48.5 %    MCV 72 (L) 82 - 98 fL    MCH 22.1 (L) 27.0 - 31.0 pg    MCHC 30.6 (L) 32.0 - 36.0 g/dL    RDW 19.3 (H) 11.5 - 14.5 %    Platelets 270 150 - 450 K/uL    MPV 11.4 9.2 - 12.9 fL    Immature Granulocytes 0.3 0.0 - 0.5 %    Gran # (ANC) 8.6 (H) 1.8 - 7.7 K/uL    Immature Grans (Abs) 0.03 0.00 - 0.04 K/uL    Lymph # 1.5 1.0 - 4.8 K/uL    Mono # 0.7 0.3 - 1.0 K/uL    Eos # 0.0 0.0 - 0.5 K/uL    Baso # 0.02 0.00 - 0.20 K/uL    nRBC 0 0 /100 WBC    Gran % 79.8 (H) 38.0 - 73.0 %    Lymph % 13.5 (L) 18.0 - 48.0 %    Mono % 6.2 4.0 - 15.0 %    Eosinophil % 0.0 0.0 - 8.0 %    Basophil % 0.2 0.0 - 1.9 %    Differential Method Automated    Comprehensive metabolic panel   Result Value Ref Range    Sodium 138 136 - 145 mmol/L    Potassium 4.1 3.5 - 5.1 mmol/L    Chloride 110 95 - 110 mmol/L    CO2 16 (L) 23 - 29 mmol/L    Glucose 108 70 - 110 mg/dL    BUN 11 8 - 23 mg/dL    Creatinine 0.7 0.5 - 1.4 mg/dL    Calcium 9.1 8.7 - 10.5 mg/dL    Total Protein 7.1 6.0 - 8.4 g/dL    Albumin 4.1 3.5 - 5.2 g/dL    Total Bilirubin 0.5 0.1 - 1.0 mg/dL    Alkaline Phosphatase 75 55 - 135 U/L    AST 19 10 - 40 U/L    ALT 8 (L) 10 - 44 U/L    Anion Gap 12 8 - 16 mmol/L    eGFR >60 >60 mL/min/1.73 m^2   Urinalysis Catheterized   Result Value Ref Range    Specimen UA Urine, Catheterized     Color, UA Yellow Yellow, Straw, Alejandra    Appearance, UA Clear Clear    pH, UA 6.0 5.0 - 8.0    Specific Gravity, UA 1.020 1.005 - 1.030    Protein, UA Negative Negative    Glucose, UA Negative Negative    Ketones, UA 1+ (A) Negative    Bilirubin (UA) Negative Negative    Occult Blood UA Negative Negative    Nitrite, UA Negative Negative    Urobilinogen, UA Negative  <2.0 EU/dL    Leukocytes, UA Negative Negative   Protime-INR   Result Value Ref Range    Prothrombin Time 10.3 9.0 - 12.5 sec    INR 1.0 0.8 - 1.2   APTT   Result Value Ref Range    aPTT 22.0 21.0 - 32.0 sec   Type And Screen Preop   Result Value Ref Range    Group & Rh O POS     Indirect Sangita NEG         Significant Imaging: X-Ray Chest AP Portable  Narrative: EXAMINATION:  XR CHEST AP PORTABLE    CLINICAL HISTORY:  Encounter for other preprocedural examination    TECHNIQUE:  Single frontal view of the chest was performed.    COMPARISON:  None    FINDINGS:  Mild interstitial prominence may relate to senescent changes.The lungs are otherwise clear, with normal appearance of pulmonary vasculature and no pleural effusion or pneumothorax.    The cardiac silhouette is normal in size. The hilar and mediastinal contours are unremarkable.    Bones are intact.  Impression: No acute abnormality.    Electronically signed by: Jean Marie Stephens  Date:    11/09/2022  Time:    22:17  X-Ray Wrist Complete Left  Narrative: EXAMINATION:  XR WRIST COMPLETE 3 VIEWS LEFT    CLINICAL HISTORY:  Unspecified fall, initial encounter    TECHNIQUE:  PA, lateral, and oblique views of the left wrist were performed.    COMPARISON:  None    FINDINGS:  Cortical fracture of the distal radius along the radial side and dorsal side.  Slight cortical irregularity of the lunate bone adjacent to the radioulnar articulation of uncertain chronicity.  The lunate and triquetrum appear to be fused.  Impression: As above    Electronically signed by: Jean Marie Stephens  Date:    11/09/2022  Time:    21:16  X-Ray Femur Ap/Lat Left  Narrative: EXAMINATION:  XR FEMUR 2 VIEW LEFT    CLINICAL HISTORY:  XR FEMUR 2 VIEW LEFTUnspecified fall, initial encounter    COMPARISON:  None    FINDINGS:  Multiple radiographic views  were obtained.    Suggestion of a nondisplaced left inter trochanteric fracture.  No other fracture or dislocation  Impression: As above    Electronically  signed by: Jean Marie Stephens  Date:    11/09/2022  Time:    21:14  X-Ray Pelvis Routine AP  Narrative: EXAMINATION:  XR PELVIS ROUTINE AP    CLINICAL HISTORY:  fall;    TECHNIQUE:  AP view of the pelvis was performed.    COMPARISON:  None.    FINDINGS:  Moderate amount of stool in the colon.  Linear lucency along the intertrochanteric region suggestive of a nondisplaced wall intertrochanteric fracture.  Correlate clinically.  Mild degenerative joint disease.  Impression: As above    Electronically signed by: Jean Marie Stephens  Date:    11/09/2022  Time:    21:11      Assessment/Plan:     * Closed nondisplaced intertrochanteric fracture of left femur  Left intertrochanteric fracture on xray  Ortho notified  Plans for surgery in am  Npo  Pain control     CHARO (generalized anxiety disorder)  Resume home buspar      Closed fracture of distal end of left radius  Currently in splint  Pain controlled      VTE Risk Mitigation (From admission, onward)         Ordered     Place sequential compression device  Until discontinued         11/10/22 0255                   Jenaro William MD  Department of Hospital Medicine   '[facility] - Emergency Dept.

## 2022-11-10 NOTE — ASSESSMENT & PLAN NOTE
Left intertrochanteric fracture on xray  Ortho notified  Plans for surgery in am  Npo  Pain control   11/10:  --surgery today  --prn analgesia  --PT/OT after surgery  --ortho following

## 2022-11-10 NOTE — OP NOTE
'[facility] - Surgery ([facility])  Orthopaedic Surgery  Operative Note    SUMMARY     Date of Procedure: 11/10/2022     Procedure:   Intramedullary nail fixation of left intertrochanteric femur fracture      Surgeon(s) and Role:     * Deni Lynn MD - Primary    Assisting Surgeon: Brandon Toro PA-C    Pre-Operative Diagnosis:   Closed displaced intertrochanteric fracture of left femur    Post-Operative Diagnosis:   Closed displaced intertrochanteric fracture of left femur    Anesthesia: General    Operative Findings (including complications, if any):  Left intertrochanteric femur fracture, poor bone quality, no complications    Description of Technical Procedures:   The patient was brought to the operating room and after administration of adequate general anesthetic she was placed on the Armstrong table.  The hip was reduced with traction and internal rotation.  C-arm image intensifier views confirmed anatomic reduction of the left intertrochanteric femur fracture.  The left hip was then prepped and draped in usual fashion for lateral exposure.  The area of planned skin incision was infiltrated with DESIRE solution.  A 2.5 cm incision was made proximal to the greater trochanter.  The fascia was opened.  A guide pin was then placed through the tip of the greater trochanter into the proximal femur for appropriate positioning with AP and lateral views confirming good position.  The tip of the greater trochanter was then opened with a 16.5 mm Reamer.  Artie length was then determined by placing a artie over the femur and using the C-arm with the artie positioned at the tip of the greater trochanter a 360 mm long 10 mm diameter artie was deemed appropriate.  This artie was then impacted into place with good position obtained in the proximal and distal femur.  Using the out  device on cm incision was made in the lateral thigh and a guide pin inserted into the femoral head and neck under image intensifier control.  Slightly inferior and  slightly anterior position was obtained and was accepted.  Length of the lag screw was determined to be 85 mm.  The lateral femoral cortex, neck and head were then reamed to that depth.  8 place.  5 mm lag screw was then put into place with good purchase obtained in subchondral bone.  The locking device was removed to allow the lag screw to compress if needed.  Using the out  device another 1 cm incision was made in the mid thigh for placement of the locking screw.  Using the out  device drill hole was made through the hole in the debbie and screw length was determined to be 32 mm.  The 32 mm locking screw was then put into place.  The out  device was removed.  Final x-rays confirmed anatomic reduction of the femur with good hardware placement.  The incisions were irrigated with saline solution.  The fascia was closed at the proximal incision with 1. Vicryl.  Subcutaneous tissues were closed the other incisions with 2-0 Vicryl and the skin with staples.  Sterile dressing was applied.  Patient was awakened and brought to the recovery    Significant Surgical Tasks Conducted by the Assistant(s), if Applicable:  Room in stable condition surgical assistant was required to maintain fracture and hardware placement throughout the procedure as pins and screws were placed in the femur.    Estimated Blood Loss (EBL): 75 cc           Implants:   Implant Name Type Inv. Item Serial No.  Lot No. LRB No. Used Action   guide pin    [id number] Left 1 Implanted and Explanted   guide pin    [employer] [id number] Left 1 Implanted and Explanted   ball nose guide wire      Left 1 Implanted and Explanted   NAIL [employer]OCHN[employer] L [id number] - [id number]  NAIL TROCHNTRC L [id number]  [employer]  Left 1 Implanted   LAG SCREW    ARTH[employer]X  Left 1 Implanted   SCREW    [employer]X  Left 1 Implanted       Specimens:  none              Condition: Good    Disposition: PACU - hemodynamically stable.    Attestation:  I performed the procedure.    Post Plan:   24 hours of prophylactic antibiotics   Aspirin 81 mg b.i.d. for DVT prophylaxis starting tomorrow morning  Occupational physical therapy, weight bear as tolerated  Return to the [facility] for staple removals in 2 weeks    The patient also has a nondisplaced torus like fracture of the left distal radius being treated with a wrist brace.  She can weight bear on the wrist as comfort allows.

## 2022-11-10 NOTE — PROGRESS NOTES
[facility] - Emergency Dept.  [facility] Medicine  Progress Note    Patient Name: Ayde Felix  MRN: [id number]  Patient Class: IP- Inpatient   Admission Date: 11/9/2022  Length of Stay: 1 days  Attending Physician: Dat Heath, *  Primary Care Provider: Primary Doctor No        Subjective:     Principal Problem:Closed nondisplaced intertrochanteric fracture of left femur        HPI:  Patient is a 72 y.o. aa female with a PMH of CHARO who presents to the Emergency Department for evaluation of left wrist and LLE pain secondary to fall at 13:00. Patient suffered a fall when she was walking upstairs. Currently complains of left wrist and left hip pain however improving. Denies any other issues.     In the ED, xrays revealed left distal radial fracture and left intertrochanteric fracture. Labs showed an h/h 9.1/29.7 otherwise unremarkable. Ortho consulted who recommended admission to  and plans for surgery in the am.       Overview/Hospital Course:  Patient was admitted for closed nondisplaced intertrochanteric fx of L femur under the care of hospital medicine. Orthopedic surgery was consulted.      Interval History: Pt scheduled for surgery today at 1pm. Currently NPO - quezada cath and IV pain medication ordered.    Review of Systems   Constitutional:  Positive for activity change. Negative for fatigue and fever.   HENT:  Negative for congestion, rhinorrhea and trouble swallowing.    Eyes:  Negative for pain and redness.   Respiratory:  Negative for cough, shortness of breath and wheezing.    Cardiovascular:  Negative for chest pain, palpitations and leg swelling.   Gastrointestinal:  Negative for abdominal distention, abdominal pain, nausea and vomiting.   Genitourinary:  Negative for difficulty urinating.   Musculoskeletal:  Positive for arthralgias, gait problem and myalgias. Negative for back pain.        Wrist pain, hip pain     Skin:  Negative for color change and wound.   Allergic/Immunologic: Negative.     Neurological:  Negative for headaches.   Hematological: Negative.    Psychiatric/Behavioral:  Negative for agitation, behavioral problems and confusion.    Objective:     Vital Signs (Most Recent):  Temp: 98.6 °F (37 °C) (11/10/22 0800)  Pulse: 75 (11/10/22 0800)  Resp: 16 (11/10/22 1057)  BP: 137/69 (11/10/22 0800)  SpO2: 96 % (11/10/22 0800) Vital Signs (24h Range):  Temp:  [98.6 °F (37 °C)-98.9 °F (37.2 °C)] 98.6 °F (37 °C)  Pulse:  [] 75  Resp:  [12-18] 16  SpO2:  [95 %-99 %] 96 %  BP: (137-156)/(65-82) 137/69     Weight: 47.6 kg (105 lb)  Body mass index is 16.45 kg/m².  No intake or output data in the 24 hours ending 11/10/22 1132   Physical Exam  Constitutional:       General: She is not in acute distress.     Appearance: She is well-developed and underweight. She is not diaphoretic.   HENT:      Head: Normocephalic and atraumatic.      Mouth/Throat:      Mouth: Mucous membranes are moist.   Eyes:      Pupils: Pupils are equal, round, and reactive to light.   Cardiovascular:      Rate and Rhythm: Normal rate and regular rhythm.      Heart sounds: Normal heart sounds. No murmur heard.    No friction rub. No gallop.   Pulmonary:      Effort: Pulmonary effort is normal. No respiratory distress.      Breath sounds: Normal breath sounds. No stridor. No wheezing or rales.   Abdominal:      General: Bowel sounds are normal. There is no distension.      Palpations: Abdomen is soft. There is no mass.      Tenderness: There is no abdominal tenderness. There is no guarding.   Genitourinary:     Comments: Snowden cath draining to gravity  Musculoskeletal:      Comments: Left arm in splint   Skin:     General: Skin is warm.      Capillary Refill: Capillary refill takes 2 to 3 seconds.      Findings: No erythema.   Neurological:      Mental Status: She is alert and oriented to person, place, and time. Mental status is at baseline.   Psychiatric:         Mood and Affect: Mood normal.         Behavior: Behavior normal.        Significant Labs: All pertinent labs within the past 24 hours have been reviewed.  Recent Lab Results         11/10/22  0029   11/09/22  2322   11/09/22  2222        Albumin     4.1       Alkaline Phosphatase     75       ALT     8       Anion Gap     12       Appearance, UA Clear           aPTT     22.0  Comment: aPTT therapeutic range = 39-69 seconds       AST     19       Baso #     0.02       Basophil %     0.2       Bilirubin (UA) Negative           BILIRUBIN TOTAL     0.5  Comment: For infants and newborns, interpretation of results should be based  on gestational age, weight and in agreement with clinical  observations.    Premature Infant recommended reference ranges:  Up to 24 hours.............<8.0 mg/dL  Up to 48 hours............<12.0 mg/dL  3-5 days..................<15.0 mg/dL  6-29 days.................<15.0 mg/dL         BUN     11       Calcium     9.1       Chloride     110       CO2     16       Color, UA Yellow           Creatinine     0.7       Differential Method     Automated       eGFR     >60       Eos #     0.0       Eosinophil %     0.0       Glucose     108       Glucose, UA Negative           Gran # (ANC)     8.6       Gran %     79.8       Group & Rh   O POS         Hematocrit     29.7       Hemoglobin     9.1       Immature Grans (Abs)     0.03  Comment: Mild elevation in immature granulocytes is non specific and   can be seen in a variety of conditions including stress response,   acute inflammation, trauma and pregnancy. Correlation with other   laboratory and clinical findings is essential.         Immature Granulocytes     0.3       INDIRECT GILMA   NEG         INR     1.0  Comment: Coumadin Therapy:  2.0 - 3.0 for INR for all indicators except mechanical heart valves  and antiphospholipid syndromes which should use 2.5 - 3.5.         Ketones, UA 1+           Leukocytes, UA Negative           Lymph #     1.5       Lymph %     13.5       MCH     22.1       MCHC     30.6        MCV     72       Mono #     0.7       Mono %     6.2       MPV     11.4       NITRITE UA Negative           nRBC     0       Occult Blood UA Negative           pH, UA 6.0           Platelets     270       Potassium     4.1       PROTEIN TOTAL     7.1       Protein, UA Negative  Comment: Recommend a 24 hour urine protein or a urine   protein/creatinine ratio if globulin induced proteinuria is  clinically suspected.             Protime     10.3       RBC     4.12       RDW     19.3       Sodium     138       Specific Gravity, UA 1.020           Specimen UA Urine, Catheterized           UROBILINOGEN UA Negative           WBC     10.74               Significant Imaging: I have reviewed all pertinent imaging results/findings within the past 24 hours.      Assessment/Plan:      * Closed nondisplaced intertrochanteric fracture of left femur  Left intertrochanteric fracture on xray  Ortho notified  Plans for surgery in am  Npo  Pain control   11/10:  --surgery today  --prn analgesia  --PT/OT after surgery  --ortho following    CHARO (generalized anxiety disorder)  Resume home buspar      Closed fracture of distal end of left radius  Currently in splint  Pain controlled        VTE Risk Mitigation (From admission, onward)         Ordered     Reason for No Pharmacological VTE Prophylaxis  Once        Question:  Reasons:  Answer:  Physician Provided (leave comment)    11/10/22 1208     Place sequential compression device  Until discontinued         11/10/22 0255                Discharge Planning   ERENDIRA:      Code Status: Full Code   Is the patient medically ready for discharge?:     Reason for patient still in hospital (select all that apply): Treatment, Consult recommendations, PT / OT recommendations and Pending disposition                     DORI Myrick  Department of Hospital Medicine   '[facility] - Emergency Dept.

## 2022-11-10 NOTE — CONSULTS
[facility] Surg  Orthopedics  Consult Note    Patient Name: Ayde Felix  MRN: [id number]  Admission Date: 11/9/2022  Hospital Length of Stay: 1 days  Attending Provider: Dat Heath, *  Primary Care Provider: Primary Doctor No    Patient information was obtained from patient, past medical records, ER records, and primary team.     Inpatient consult to Orthopedic Surgery  Consult performed by: Brandon Toro PA-C  Consult ordered by: Matt Delgado MD      Subjective:     Principal Problem:Closed nondisplaced intertrochanteric fracture of left femur    Chief Complaint:   Chief Complaint   Patient presents with    Fall     Pt had a slip/trip fall at home. C/o left upper leg pain and L wrist pain pt states she did hit her head but denies LOC +thinners        HPI: Ayde Felix is a 72-year-old female with past medical history significant for generalized anxiety disorder who is in the emergency department following a trip and fall at home just prior to arrival.  Patient suffered a left intertrochanteric femur fracture and a left distal radius fracture.  Patient reports that the left wrist is tender, but she denies numbness or tingling and she reports pain been well controlled.  Patient reports immediate pain to the left hip following the fall and inability to bear weight.  She denies numbness or tingling in the extremity.  Patient denies loss of consciousness.  Patient denies use of blood thinners.    No past medical history on file.    No past surgical history on file.    Review of patient's allergies indicates:  No Known Allergies    Current Facility-Administered Medications   Medication    0.9%  NaCl infusion    acetaminophen tablet 650 mg    busPIRone tablet 10 mg    HYDROcodone-acetaminophen 7.5-325 mg per tablet 1 tablet    mirtazapine tablet 30 mg    morphine injection 4 mg    ondansetron injection 4 mg    polyethylene glycol packet 17 g     Current Outpatient Medications   Medication Sig     "busPIRone (BUSPAR) 10 MG tablet Take 10 mg by mouth 3 (three) times daily.    REMERON 30 mg tablet Take 30 mg by mouth every evening.     Family History    None       Tobacco Use    Smoking status: Not on file    Smokeless tobacco: Not on file   Substance and Sexual Activity    Alcohol use: Not on file    Drug use: Not on file    Sexual activity: Not on file     Review of Systems   Constitutional: Negative for chills, decreased appetite, fever and weight loss.   HENT:  Negative for congestion, hoarse voice and sore throat.    Eyes:  Negative for blurred vision, double vision, vision loss in left eye and vision loss in right eye.   Cardiovascular:  Negative for chest pain, palpitations and syncope.   Respiratory:  Negative for cough, shortness of breath and wheezing.    Endocrine: Negative for cold intolerance and heat intolerance.   Hematologic/Lymphatic: Negative for bleeding problem. Does not bruise/bleed easily.   Skin:  Negative for dry skin, flushing, itching and suspicious lesions.   Musculoskeletal:  Positive for falls, joint pain and joint swelling.   Gastrointestinal:  Negative for abdominal pain, diarrhea, nausea and vomiting.   Genitourinary:  Negative for dysuria, frequency and urgency.   Neurological:  Negative for dizziness, headaches, numbness and paresthesias.   Psychiatric/Behavioral:  Negative for altered mental status and memory loss.    Objective:     Vital Signs (Most Recent):  Temp: 98.6 °F (37 °C) (11/10/22 0800)  Pulse: 75 (11/10/22 0800)  Resp: 16 (11/10/22 1057)  BP: 137/69 (11/10/22 0800)  SpO2: 96 % (11/10/22 0800) Vital Signs (24h Range):  Temp:  [98.6 °F (37 °C)-98.9 °F (37.2 °C)] 98.6 °F (37 °C)  Pulse:  [] 75  Resp:  [12-18] 16  SpO2:  [95 %-99 %] 96 %  BP: (137-156)/(65-82) 137/69     Weight: 47.6 kg (105 lb)  Height: 5' 7" (170.2 cm)  Body mass index is 16.45 kg/m².    No intake or output data in the 24 hours ending 11/10/22 1131    Ortho/SPM Exam  Left lower extremity: "   Extremity is externally rotated, but not shortened   Skin is intact   Minimal pain with logroll   ROM not tested  Calf and compartments are soft and compressible  Motor exam normal   Sensation and pulses intact     Left wrist:  Skin is intact   No obvious deformity   Mild TTP over the lateral aspect of the distal radius   Mild edema   ROM slightly painful   Compartments are soft and compressible  Motor exam normal   Sensation and pulses intact   Cap refill brisk    GEN: Well developed, well nourished female. AAOX3. No acute distress.   Head: Normocephalic, atraumatic.   Eyes: RIP  Neck: Trachea is midline, no adenopathy  Resp: Breathing unlabored.  Neuro: Motor function normal, Cranial nerves intact  Psych: Mood and affect appropriate.      Significant Labs:   Recent Lab Results         11/10/22  0029   11/09/22  2322   11/09/22  2222        Albumin     4.1       Alkaline Phosphatase     75       ALT     8       Anion Gap     12       Appearance, UA Clear           aPTT     22.0  Comment: aPTT therapeutic range = 39-69 seconds       AST     19       Baso #     0.02       Basophil %     0.2       Bilirubin (UA) Negative           BILIRUBIN TOTAL     0.5  Comment: For infants and newborns, interpretation of results should be based  on gestational age, weight and in agreement with clinical  observations.    Premature Infant recommended reference ranges:  Up to 24 hours.............<8.0 mg/dL  Up to 48 hours............<12.0 mg/dL  3-5 days..................<15.0 mg/dL  6-29 days.................<15.0 mg/dL         BUN     11       Calcium     9.1       Chloride     110       CO2     16       Color, UA Yellow           Creatinine     0.7       Differential Method     Automated       eGFR     >60       Eos #     0.0       Eosinophil %     0.0       Glucose     108       Glucose, UA Negative           Gran # (ANC)     8.6       Gran %     79.8       Group & Rh   O POS         Hematocrit     29.7       Hemoglobin      9.1       Immature Grans (Abs)     0.03  Comment: Mild elevation in immature granulocytes is non specific and   can be seen in a variety of conditions including stress response,   acute inflammation, trauma and pregnancy. Correlation with other   laboratory and clinical findings is essential.         Immature Granulocytes     0.3       INDIRECT GILMA   NEG         INR     1.0  Comment: Coumadin Therapy:  2.0 - 3.0 for INR for all indicators except mechanical heart valves  and antiphospholipid syndromes which should use 2.5 - 3.5.         Ketones, UA 1+           Leukocytes, UA Negative           Lymph #     1.5       Lymph %     13.5       MCH     22.1       MCHC     30.6       MCV     72       Mono #     0.7       Mono %     6.2       MPV     11.4       NITRITE UA Negative           nRBC     0       Occult Blood UA Negative           pH, UA 6.0           Platelets     270       Potassium     4.1       PROTEIN TOTAL     7.1       Protein, UA Negative  Comment: Recommend a 24 hour urine protein or a urine   protein/creatinine ratio if globulin induced proteinuria is  clinically suspected.             Protime     10.3       RBC     4.12       RDW     19.3       Sodium     138       Specific Gravity, UA 1.020           Specimen UA Urine, Catheterized           UROBILINOGEN UA Negative           WBC     10.74             All pertinent labs within the past 24 hours have been reviewed.    Significant Imaging: X-Ray: I have reviewed all pertinent results/findings and my personal findings are:  X-ray left wrist shows a cortical irregularity of the radial dorsal aspect of the distal radius.  No displacement or angulation noted.  X-ray left femur shows nondisplaced intertrochanteric fracture.  No acute dislocation    Assessment/Plan:     Active Diagnoses:    Diagnosis Date Noted POA    PRINCIPAL PROBLEM:  Closed nondisplaced intertrochanteric fracture of left femur [S72.145A] 11/10/2022 Yes    Closed fracture of distal end  of left radius [S52.502A] 11/10/2022 Yes    CHARO (generalized anxiety disorder) [F41.1] 11/10/2022 Yes      Problems Resolved During this Admission:       Assessment:   72-year-old female with past medical history significant for generalized anxiety disorder is being admitted for left intertrochanteric femur fracture and left distal radius fracture    Plan:   Reviewed the radiographs with the patient.  For left wrist recommended non operative management in a wrist brace.  She was already wearing a brace, which I adjusted to fit more comfortably.  Discussed operative versus non operative management for her left foot.  Recommended surgical fixation with cephalomedullary nail.  Discussed risks and benefits of the surgery.  All questions were asked and answered, patient voiced understanding and would like to proceed.  Keep patient NPO and nonweightbearing.  Plan to take the patient to the operating room later today  Patient is being admitted to hospital medicine for medical management  Patient will need therapy postoperatively for gait training ADLs  Appreciate discharge disposition from therapy and primary team    Brandon Toro PA-C  Orthopedics  [facility] - Med Surg

## 2022-11-10 NOTE — HPI
Patient is a 72 y.o. aa female with a PMH of CHARO who presents to the Emergency Department for evaluation of left wrist and LLE pain secondary to fall at 13:00. Patient suffered a fall when she was walking upstairs. Currently complains of left wrist and left hip pain however improving. Denies any other issues.     In the ED, xrays revealed left distal radial fracture and left intertrochanteric fracture. Labs showed an h/h 9.1/29.7 otherwise unremarkable. Ortho consulted who recommended admission to  and plans for surgery in the am.

## 2022-11-10 NOTE — HOSPITAL COURSE
Patient was admitted for closed nondisplaced intertrochanteric fx of L femur under the care of hospital medicine. Orthopedic surgery was consulted. 11/10:  Pt scheduled for surgery today at 1pm. Made NPO - quezada cath and IV pain medication ordered.  Underwent surgical repair and tolerated the procedure well.  On 11/11 POD #2 Patient sitting up in chair at bedside,  reports feeling well, pain controlled. PT rec IPR, case management consulted.  Patient doing well postoperatively.  Was discharged to SNF in stable condition on 11/17

## 2022-11-10 NOTE — SUBJECTIVE & OBJECTIVE
Interval History: Pt scheduled for surgery today at 1pm. Currently NPO - quezada cath and IV pain medication ordered.    Review of Systems   Constitutional:  Positive for activity change. Negative for fatigue and fever.   HENT:  Negative for congestion, rhinorrhea and trouble swallowing.    Eyes:  Negative for pain and redness.   Respiratory:  Negative for cough, shortness of breath and wheezing.    Cardiovascular:  Negative for chest pain, palpitations and leg swelling.   Gastrointestinal:  Negative for abdominal distention, abdominal pain, nausea and vomiting.   Genitourinary:  Negative for difficulty urinating.   Musculoskeletal:  Positive for arthralgias, gait problem and myalgias. Negative for back pain.        Wrist pain, hip pain     Skin:  Negative for color change and wound.   Allergic/Immunologic: Negative.    Neurological:  Negative for headaches.   Hematological: Negative.    Psychiatric/Behavioral:  Negative for agitation, behavioral problems and confusion.    Objective:     Vital Signs (Most Recent):  Temp: 98.6 °F (37 °C) (11/10/22 0800)  Pulse: 75 (11/10/22 0800)  Resp: 16 (11/10/22 1057)  BP: 137/69 (11/10/22 0800)  SpO2: 96 % (11/10/22 0800) Vital Signs (24h Range):  Temp:  [98.6 °F (37 °C)-98.9 °F (37.2 °C)] 98.6 °F (37 °C)  Pulse:  [] 75  Resp:  [12-18] 16  SpO2:  [95 %-99 %] 96 %  BP: (137-156)/(65-82) 137/69     Weight: 47.6 kg (105 lb)  Body mass index is 16.45 kg/m².  No intake or output data in the 24 hours ending 11/10/22 1132   Physical Exam  Constitutional:       General: She is not in acute distress.     Appearance: She is well-developed and underweight. She is not diaphoretic.   HENT:      Head: Normocephalic and atraumatic.      Mouth/Throat:      Mouth: Mucous membranes are moist.   Eyes:      Pupils: Pupils are equal, round, and reactive to light.   Cardiovascular:      Rate and Rhythm: Normal rate and regular rhythm.      Heart sounds: Normal heart sounds. No murmur heard.    No  friction rub. No gallop.   Pulmonary:      Effort: Pulmonary effort is normal. No respiratory distress.      Breath sounds: Normal breath sounds. No stridor. No wheezing or rales.   Abdominal:      General: Bowel sounds are normal. There is no distension.      Palpations: Abdomen is soft. There is no mass.      Tenderness: There is no abdominal tenderness. There is no guarding.   Genitourinary:     Comments: Snowden cath draining to gravity  Musculoskeletal:      Comments: Left arm in splint   Skin:     General: Skin is warm.      Capillary Refill: Capillary refill takes 2 to 3 seconds.      Findings: No erythema.   Neurological:      Mental Status: She is alert and oriented to person, place, and time. Mental status is at baseline.   Psychiatric:         Mood and Affect: Mood normal.         Behavior: Behavior normal.       Significant Labs: All pertinent labs within the past 24 hours have been reviewed.  Recent Lab Results         11/10/22  0029   11/09/22  2322   11/09/22  2222        Albumin     4.1       Alkaline Phosphatase     75       ALT     8       Anion Gap     12       Appearance, UA Clear           aPTT     22.0  Comment: aPTT therapeutic range = 39-69 seconds       AST     19       Baso #     0.02       Basophil %     0.2       Bilirubin (UA) Negative           BILIRUBIN TOTAL     0.5  Comment: For infants and newborns, interpretation of results should be based  on gestational age, weight and in agreement with clinical  observations.    Premature Infant recommended reference ranges:  Up to 24 hours.............<8.0 mg/dL  Up to 48 hours............<12.0 mg/dL  3-5 days..................<15.0 mg/dL  6-29 days.................<15.0 mg/dL         BUN     11       Calcium     9.1       Chloride     110       CO2     16       Color, UA Yellow           Creatinine     0.7       Differential Method     Automated       eGFR     >60       Eos #     0.0       Eosinophil %     0.0       Glucose     108        Glucose, UA Negative           Gran # (ANC)     8.6       Gran %     79.8       Group & Rh   O POS         Hematocrit     29.7       Hemoglobin     9.1       Immature Grans (Abs)     0.03  Comment: Mild elevation in immature granulocytes is non specific and   can be seen in a variety of conditions including stress response,   acute inflammation, trauma and pregnancy. Correlation with other   laboratory and clinical findings is essential.         Immature Granulocytes     0.3       INDIRECT GILMA   NEG         INR     1.0  Comment: Coumadin Therapy:  2.0 - 3.0 for INR for all indicators except mechanical heart valves  and antiphospholipid syndromes which should use 2.5 - 3.5.         Ketones, UA 1+           Leukocytes, UA Negative           Lymph #     1.5       Lymph %     13.5       MCH     22.1       MCHC     30.6       MCV     72       Mono #     0.7       Mono %     6.2       MPV     11.4       NITRITE UA Negative           nRBC     0       Occult Blood UA Negative           pH, UA 6.0           Platelets     270       Potassium     4.1       PROTEIN TOTAL     7.1       Protein, UA Negative  Comment: Recommend a 24 hour urine protein or a urine   protein/creatinine ratio if globulin induced proteinuria is  clinically suspected.             Protime     10.3       RBC     4.12       RDW     19.3       Sodium     138       Specific Gravity, UA 1.020           Specimen UA Urine, Catheterized           UROBILINOGEN UA Negative           WBC     10.74               Significant Imaging: I have reviewed all pertinent imaging results/findings within the past 24 hours.

## 2022-11-10 NOTE — PT/OT/SLP PROGRESS
Physical Therapy      Patient Name:  Ayde Felix   MRN:  [id number]    11/10/2022  0855   P.T. completed chart review. Pt with left distal radial fracture and left intertrochanteric fracture. Pt waiting on ortho and possible sx in am per chart. P.T. to completed eval and tx s/p sx with orthos recs for WB. Thank you. Muriel Jarrett, PT

## 2022-11-10 NOTE — ANESTHESIA PROCEDURE NOTES
Intubation    Date/Time: 11/10/2022 1:07 PM  Performed by: Arsalan Del Angel CRNA  Authorized by: Guzman Jean-Baptiste MD     Intubation:     Induction:  Intravenous    Intubated:  Postinduction    Mask Ventilation:  Easy mask    Attempts:  1    Attempted By:  CRNA    Method of Intubation:  Direct    Blade:  Ramirez 2    Laryngeal View Grade: Grade I - full view of cords      Difficult Airway Encountered?: No      Complications:  None    Airway Device:  Oral endotracheal tube    Airway Device Size:  7.5    Style/Cuff Inflation:  Cuffed (inflated to minimal occlusive pressure)    Tube secured:  22    Secured at:  The lips    Placement Verified By:  Capnometry    Complicating Factors:  None    Findings Post-Intubation:  BS equal bilateral

## 2022-11-10 NOTE — SUBJECTIVE & OBJECTIVE
No past medical history on file.    No past surgical history on file.    Review of patient's allergies indicates:  No Known Allergies    No current facility-administered medications on file prior to encounter.     Current Outpatient Medications on File Prior to Encounter   Medication Sig    busPIRone (BUSPAR) 10 MG tablet Take 10 mg by mouth 3 (three) times daily.    REMERON 30 mg tablet Take 30 mg by mouth every evening.     Family History    None       Tobacco Use    Smoking status: Not on file    Smokeless tobacco: Not on file   Substance and Sexual Activity    Alcohol use: Not on file    Drug use: Not on file    Sexual activity: Not on file     Review of Systems   Constitutional:  Negative for fatigue and fever.   HENT:  Negative for sinus pressure.    Eyes:  Negative for visual disturbance.   Respiratory:  Negative for shortness of breath.    Cardiovascular:  Negative for chest pain.   Gastrointestinal:  Negative for nausea and vomiting.   Genitourinary:  Negative for difficulty urinating.   Musculoskeletal:  Negative for back pain.        Wrist pain, hip pain     Skin:  Negative for rash.   Neurological:  Negative for headaches.   Psychiatric/Behavioral:  Negative for confusion.    Objective:     Vital Signs (Most Recent):  Temp: 98.9 °F (37.2 °C) (11/09/22 1927)  Pulse: 75 (11/10/22 0132)  Resp: 14 (11/10/22 0132)  BP: (!) 150/72 (11/10/22 0132)  SpO2: 96 % (11/10/22 0132)   Vital Signs (24h Range):  Temp:  [98.9 °F (37.2 °C)] 98.9 °F (37.2 °C)  Pulse:  [] 75  Resp:  [14-18] 14  SpO2:  [96 %-99 %] 96 %  BP: (147-156)/(67-82) 150/72     Weight: 47.6 kg (105 lb)  Body mass index is 16.45 kg/m².    Physical Exam  Constitutional:       General: She is not in acute distress.     Appearance: She is well-developed. She is not diaphoretic.   HENT:      Head: Normocephalic and atraumatic.   Eyes:      Pupils: Pupils are equal, round, and reactive to light.   Cardiovascular:      Rate and Rhythm: Normal rate and  regular rhythm.      Heart sounds: Normal heart sounds. No murmur heard.    No friction rub. No gallop.   Pulmonary:      Effort: Pulmonary effort is normal. No respiratory distress.      Breath sounds: Normal breath sounds. No stridor. No wheezing or rales.   Abdominal:      General: Bowel sounds are normal. There is no distension.      Palpations: Abdomen is soft. There is no mass.      Tenderness: There is no abdominal tenderness. There is no guarding.   Musculoskeletal:      Comments: Left arm in splint   Skin:     General: Skin is warm.      Findings: No erythema.   Neurological:      Mental Status: She is alert and oriented to person, place, and time.         CRANIAL NERVES     CN III, IV, VI   Pupils are equal, round, and reactive to light.     Significant Labs:   Results for orders placed or performed during the hospital encounter of 11/09/22   CBC auto differential   Result Value Ref Range    WBC 10.74 3.90 - 12.70 K/uL    RBC 4.12 4.00 - 5.40 M/uL    Hemoglobin 9.1 (L) 12.0 - 16.0 g/dL    Hematocrit 29.7 (L) 37.0 - 48.5 %    MCV 72 (L) 82 - 98 fL    MCH 22.1 (L) 27.0 - 31.0 pg    MCHC 30.6 (L) 32.0 - 36.0 g/dL    RDW 19.3 (H) 11.5 - 14.5 %    Platelets 270 150 - 450 K/uL    MPV 11.4 9.2 - 12.9 fL    Immature Granulocytes 0.3 0.0 - 0.5 %    Gran # (ANC) 8.6 (H) 1.8 - 7.7 K/uL    Immature Grans (Abs) 0.03 0.00 - 0.04 K/uL    Lymph # 1.5 1.0 - 4.8 K/uL    Mono # 0.7 0.3 - 1.0 K/uL    Eos # 0.0 0.0 - 0.5 K/uL    Baso # 0.02 0.00 - 0.20 K/uL    nRBC 0 0 /100 WBC    Gran % 79.8 (H) 38.0 - 73.0 %    Lymph % 13.5 (L) 18.0 - 48.0 %    Mono % 6.2 4.0 - 15.0 %    Eosinophil % 0.0 0.0 - 8.0 %    Basophil % 0.2 0.0 - 1.9 %    Differential Method Automated    Comprehensive metabolic panel   Result Value Ref Range    Sodium 138 136 - 145 mmol/L    Potassium 4.1 3.5 - 5.1 mmol/L    Chloride 110 95 - 110 mmol/L    CO2 16 (L) 23 - 29 mmol/L    Glucose 108 70 - 110 mg/dL    BUN 11 8 - 23 mg/dL    Creatinine 0.7 0.5 - 1.4  mg/dL    Calcium 9.1 8.7 - 10.5 mg/dL    Total Protein 7.1 6.0 - 8.4 g/dL    Albumin 4.1 3.5 - 5.2 g/dL    Total Bilirubin 0.5 0.1 - 1.0 mg/dL    Alkaline Phosphatase 75 55 - 135 U/L    AST 19 10 - 40 U/L    ALT 8 (L) 10 - 44 U/L    Anion Gap 12 8 - 16 mmol/L    eGFR >60 >60 mL/min/1.73 m^2   Urinalysis Catheterized   Result Value Ref Range    Specimen UA Urine, Catheterized     Color, UA Yellow Yellow, Straw, Alejandra    Appearance, UA Clear Clear    pH, UA 6.0 5.0 - 8.0    Specific Gravity, UA 1.020 1.005 - 1.030    Protein, UA Negative Negative    Glucose, UA Negative Negative    Ketones, UA 1+ (A) Negative    Bilirubin (UA) Negative Negative    Occult Blood UA Negative Negative    Nitrite, UA Negative Negative    Urobilinogen, UA Negative <2.0 EU/dL    Leukocytes, UA Negative Negative   Protime-INR   Result Value Ref Range    Prothrombin Time 10.3 9.0 - 12.5 sec    INR 1.0 0.8 - 1.2   APTT   Result Value Ref Range    aPTT 22.0 21.0 - 32.0 sec   Type And Screen Preop   Result Value Ref Range    Group & Rh O POS     Indirect Sangita NEG         Significant Imaging: X-Ray Chest AP Portable  Narrative: EXAMINATION:  XR CHEST AP PORTABLE    CLINICAL HISTORY:  Encounter for other preprocedural examination    TECHNIQUE:  Single frontal view of the chest was performed.    COMPARISON:  None    FINDINGS:  Mild interstitial prominence may relate to senescent changes.The lungs are otherwise clear, with normal appearance of pulmonary vasculature and no pleural effusion or pneumothorax.    The cardiac silhouette is normal in size. The hilar and mediastinal contours are unremarkable.    Bones are intact.  Impression: No acute abnormality.    Electronically signed by: Jean Marie Stephens  Date:    11/09/2022  Time:    22:17  X-Ray Wrist Complete Left  Narrative: EXAMINATION:  XR WRIST COMPLETE 3 VIEWS LEFT    CLINICAL HISTORY:  Unspecified fall, initial encounter    TECHNIQUE:  PA, lateral, and oblique views of the left wrist were  performed.    COMPARISON:  None    FINDINGS:  Cortical fracture of the distal radius along the radial side and dorsal side.  Slight cortical irregularity of the lunate bone adjacent to the radioulnar articulation of uncertain chronicity.  The lunate and triquetrum appear to be fused.  Impression: As above    Electronically signed by: Jean Marie Stephens  Date:    11/09/2022  Time:    21:16  X-Ray Femur Ap/Lat Left  Narrative: EXAMINATION:  XR FEMUR 2 VIEW LEFT    CLINICAL HISTORY:  XR FEMUR 2 VIEW LEFTUnspecified fall, initial encounter    COMPARISON:  None    FINDINGS:  Multiple radiographic views  were obtained.    Suggestion of a nondisplaced left inter trochanteric fracture.  No other fracture or dislocation  Impression: As above    Electronically signed by: Jean Marie Stephens  Date:    11/09/2022  Time:    21:14  X-Ray Pelvis Routine AP  Narrative: EXAMINATION:  XR PELVIS ROUTINE AP    CLINICAL HISTORY:  fall;    TECHNIQUE:  AP view of the pelvis was performed.    COMPARISON:  None.    FINDINGS:  Moderate amount of stool in the colon.  Linear lucency along the intertrochanteric region suggestive of a nondisplaced wall intertrochanteric fracture.  Correlate clinically.  Mild degenerative joint disease.  Impression: As above    Electronically signed by: Jean Marie Stephens  Date:    11/09/2022  Time:    21:11

## 2022-11-11 LAB
ANION GAP SERPL CALC-SCNC: 11 MMOL/L (ref 8–16)
BASOPHILS # BLD AUTO: 0.01 K/UL (ref 0–0.2)
BASOPHILS NFR BLD: 0.1 % (ref 0–1.9)
BUN SERPL-MCNC: 9 MG/DL (ref 8–23)
CALCIUM SERPL-MCNC: 8.2 MG/DL (ref 8.7–10.5)
CHLORIDE SERPL-SCNC: 111 MMOL/L (ref 95–110)
CO2 SERPL-SCNC: 13 MMOL/L (ref 23–29)
CREAT SERPL-MCNC: 0.6 MG/DL (ref 0.5–1.4)
DIFFERENTIAL METHOD: ABNORMAL
EOSINOPHIL # BLD AUTO: 0 K/UL (ref 0–0.5)
EOSINOPHIL NFR BLD: 0.1 % (ref 0–8)
ERYTHROCYTE [DISTWIDTH] IN BLOOD BY AUTOMATED COUNT: 19.9 % (ref 11.5–14.5)
EST. GFR  (NO RACE VARIABLE): >60 ML/MIN/1.73 M^2
GLUCOSE SERPL-MCNC: 92 MG/DL (ref 70–110)
HCT VFR BLD AUTO: 24.5 % (ref 37–48.5)
HGB BLD-MCNC: 7.2 G/DL (ref 12–16)
IMM GRANULOCYTES # BLD AUTO: 0.05 K/UL (ref 0–0.04)
IMM GRANULOCYTES NFR BLD AUTO: 0.6 % (ref 0–0.5)
LYMPHOCYTES # BLD AUTO: 1.5 K/UL (ref 1–4.8)
LYMPHOCYTES NFR BLD: 16.4 % (ref 18–48)
MCH RBC QN AUTO: 22.2 PG (ref 27–31)
MCHC RBC AUTO-ENTMCNC: 29.4 G/DL (ref 32–36)
MCV RBC AUTO: 75 FL (ref 82–98)
MONOCYTES # BLD AUTO: 0.8 K/UL (ref 0.3–1)
MONOCYTES NFR BLD: 8.3 % (ref 4–15)
NEUTROPHILS # BLD AUTO: 6.7 K/UL (ref 1.8–7.7)
NEUTROPHILS NFR BLD: 74.5 % (ref 38–73)
NRBC BLD-RTO: 0 /100 WBC
PLATELET # BLD AUTO: 189 K/UL (ref 150–450)
PMV BLD AUTO: 10.7 FL (ref 9.2–12.9)
POTASSIUM SERPL-SCNC: 3.3 MMOL/L (ref 3.5–5.1)
RBC # BLD AUTO: 3.25 M/UL (ref 4–5.4)
SODIUM SERPL-SCNC: 135 MMOL/L (ref 136–145)
WBC # BLD AUTO: 9.01 K/UL (ref 3.9–12.7)

## 2022-11-11 PROCEDURE — 99024 POSTOP FOLLOW-UP VISIT: CPT | Mod: ,,, | Performed by: ORTHOPAEDIC SURGERY

## 2022-11-11 PROCEDURE — 97530 THERAPEUTIC ACTIVITIES: CPT

## 2022-11-11 PROCEDURE — 25000003 PHARM REV CODE 250: Performed by: NURSE PRACTITIONER

## 2022-11-11 PROCEDURE — 27000221 HC OXYGEN, UP TO 24 HOURS

## 2022-11-11 PROCEDURE — 25000003 PHARM REV CODE 250: Performed by: STUDENT IN AN ORGANIZED HEALTH CARE EDUCATION/TRAINING PROGRAM

## 2022-11-11 PROCEDURE — 36415 COLL VENOUS BLD VENIPUNCTURE: CPT | Performed by: STUDENT IN AN ORGANIZED HEALTH CARE EDUCATION/TRAINING PROGRAM

## 2022-11-11 PROCEDURE — 99900035 HC TECH TIME PER 15 MIN (STAT)

## 2022-11-11 PROCEDURE — 94761 N-INVAS EAR/PLS OXIMETRY MLT: CPT

## 2022-11-11 PROCEDURE — 80048 BASIC METABOLIC PNL TOTAL CA: CPT | Performed by: STUDENT IN AN ORGANIZED HEALTH CARE EDUCATION/TRAINING PROGRAM

## 2022-11-11 PROCEDURE — 99024 PR POST-OP FOLLOW-UP VISIT: ICD-10-PCS | Mod: ,,, | Performed by: ORTHOPAEDIC SURGERY

## 2022-11-11 PROCEDURE — 85025 COMPLETE CBC W/AUTO DIFF WBC: CPT | Performed by: STUDENT IN AN ORGANIZED HEALTH CARE EDUCATION/TRAINING PROGRAM

## 2022-11-11 PROCEDURE — 21400001 HC TELEMETRY ROOM

## 2022-11-11 PROCEDURE — 63600175 PHARM REV CODE 636 W HCPCS: Performed by: STUDENT IN AN ORGANIZED HEALTH CARE EDUCATION/TRAINING PROGRAM

## 2022-11-11 PROCEDURE — 25000003 PHARM REV CODE 250: Performed by: PHYSICIAN ASSISTANT

## 2022-11-11 PROCEDURE — 97162 PT EVAL MOD COMPLEX 30 MIN: CPT

## 2022-11-11 PROCEDURE — 25000003 PHARM REV CODE 250: Performed by: FAMILY MEDICINE

## 2022-11-11 PROCEDURE — 97166 OT EVAL MOD COMPLEX 45 MIN: CPT

## 2022-11-11 RX ORDER — POTASSIUM CHLORIDE 20 MEQ/1
40 TABLET, EXTENDED RELEASE ORAL ONCE
Status: COMPLETED | OUTPATIENT
Start: 2022-11-11 | End: 2022-11-11

## 2022-11-11 RX ADMIN — SODIUM CHLORIDE: 0.9 INJECTION, SOLUTION INTRAVENOUS at 08:11

## 2022-11-11 RX ADMIN — POLYETHYLENE GLYCOL 3350 17 G: 17 POWDER, FOR SOLUTION ORAL at 08:11

## 2022-11-11 RX ADMIN — CEFAZOLIN SODIUM 2 G: 2 SOLUTION INTRAVENOUS at 10:11

## 2022-11-11 RX ADMIN — POTASSIUM CHLORIDE 40 MEQ: 1500 TABLET, EXTENDED RELEASE ORAL at 12:11

## 2022-11-11 RX ADMIN — CHLORHEXIDINE GLUCONATE 0.12% ORAL RINSE 10 ML: 1.2 LIQUID ORAL at 08:11

## 2022-11-11 RX ADMIN — CEFAZOLIN SODIUM 2 G: 2 SOLUTION INTRAVENOUS at 02:11

## 2022-11-11 RX ADMIN — BUSPIRONE HYDROCHLORIDE 10 MG: 10 TABLET ORAL at 08:11

## 2022-11-11 RX ADMIN — ACETAMINOPHEN 650 MG: 325 TABLET ORAL at 12:11

## 2022-11-11 RX ADMIN — HYDROCODONE BITARTRATE AND ACETAMINOPHEN 1 TABLET: 5; 325 TABLET ORAL at 08:11

## 2022-11-11 RX ADMIN — CEFAZOLIN SODIUM 2 G: 2 SOLUTION INTRAVENOUS at 06:11

## 2022-11-11 RX ADMIN — ASPIRIN 81 MG CHEWABLE TABLET 81 MG: 81 TABLET CHEWABLE at 08:11

## 2022-11-11 RX ADMIN — MUPIROCIN: 20 OINTMENT TOPICAL at 08:11

## 2022-11-11 RX ADMIN — Medication 6 MG: at 08:11

## 2022-11-11 RX ADMIN — BUSPIRONE HYDROCHLORIDE 10 MG: 10 TABLET ORAL at 03:11

## 2022-11-11 RX ADMIN — HYDROCODONE BITARTRATE AND ACETAMINOPHEN 1 TABLET: 5; 325 TABLET ORAL at 03:11

## 2022-11-11 RX ADMIN — MIRTAZAPINE 30 MG: 15 TABLET, FILM COATED ORAL at 08:11

## 2022-11-11 NOTE — PT/OT/SLP EVAL
"Occupational Therapy   Evaluation    Name: Ayde Felix  MRN: [id number]  Admitting Diagnosis:  Closed nondisplaced intertrochanteric fracture of left femur  Recent Surgery: Procedure(s) (LRB):  ORIF, FRACTURE, FEMUR (Left) 1 Day Post-Op    Recommendations:     Discharge Recommendations: rehabilitation facility  Discharge Equipment Recommendations:   (TBD at next level of care)  Barriers to discharge:  Decreased caregiver support, Inaccessible home environment    Assessment:     Ayde Felix is a 72 y.o. female with a medical diagnosis of Closed nondisplaced intertrochanteric fracture of left femur.  She presents with the following performance deficits affecting function: weakness, impaired endurance, impaired self care skills, impaired functional mobility, impaired balance, pain, impaired cardiopulmonary response to activity, edema, orthopedic precautions.      Rehab Prognosis: Good; patient would benefit from acute skilled OT services to address these deficits and reach maximum level of function.       Plan:     Patient to be seen 2 x/week to address the above listed problems via self-care/home management, therapeutic exercises, therapeutic activities  Plan of Care Expires: 11/25/22  Plan of Care Reviewed with: patient, son    Subjective     Chief Complaint: Reported "I am doing ok."  Patient/Family Comments/goals: get stronger    Occupational Profile:  Living Environment: Patient resides in a 2 story town home with a 2nd floor bedroom and bathroom, alone.  Previous level of function: Patient was independent with ADLs and ambulation prior to admission.  Roles and Routines: Patient does not drive or work.  Equipment Used at Home:  none  Assistance upon Discharge: grandson provides transportation    Pain/Comfort:  Pain Rating 1: 0/10  Pain Addressed 1: Pre-medicate for activity (activity pacing)    Objective:     Communicated with: NurseVenkat, prior to session.  Patient found supine with telemetry, oxygen, " peripheral IV, bed alarm upon OT entry to room.    General Precautions: Standard, fall   Orthopedic Precautions:LUE weight bearing as tolerated, LLE weight bearing as tolerated (L UE WBAT with wrist splint in place)   Braces:  (L wrist splint)  Respiratory Status: Nasal cannula, flow 1 L/min    Bed Mobility:    Patient completed Supine to Sit with minimum assistance    Functional Mobility/Transfers:  Patient completed Sit <> Stand Transfer with minimum assistance  with  rolling walker   Patient completed Bed <> Chair Transfer using Step Transfer technique with moderate assistance with rolling walker  Functional Mobility: Patient completed x20ft functional mobility with RW and mod A to increase dynamic standing balance and activity tolerance needed for ADL completion.  Initially with min A for mobility, progressed quickly to mod A due to rapid fatigue in standing.    Activities of Daily Living:  Upper Body Dressing: minimum assistance .  Lower Body Dressing: maximal assistance .    Cognitive/Visual Perceptual:  Cognitive/Psychosocial Skills:     -       Oriented to: Person, Place, Time, and Situation   -       Follows Commands/attention:Follows two-step commands    Physical Exam:  Balance:    -       sitting: good, static standing: poor +, dynamic standing: poor  Upper Extremity Range of Motion:     -       Right Upper Extremity: WFL  -       Left Upper Extremity: WFL  Upper Extremity Strength:    -       Right Upper Extremity: Deficits: grossly 4/5  -       Left Upper Extremity: Deficits: grossly 4/5   Strength:    -       Right Upper Extremity: Deficits: fair  -       Left Upper Extremity: Deficits: fair    AMPAC 6 Click ADL:  AMPAC Total Score: 15    Treatment & Education:  Patient educated on role of OT in acute setting and benefits of participation. Educated on WB precautions and their functional implications, including need for wear of splint to L hand with WB. Educated on techniques to use to increase  independence and decrease fall risk with functional transfers. Educated on importance of OOB activity and calling for A to transfer back to bed. Encouraged completion of B UE AROM therex throughout the day to tolerance to increase functional strength and activity tolerance. Patient stated understanding and in agreement with POC.    Patient left up in chair with all lines intact, call button in reach, and chair alarm on    GOALS:   Multidisciplinary Problems       Occupational Therapy Goals          Problem: Occupational Therapy    Goal Priority Disciplines Outcome Interventions   Occupational Therapy Goal     OT, PT/OT     Description: Goals to be met by: 11/25/22     Patient will increase functional independence with ADLs by performing:    Toileting from toilet with Stand-by Assistance for hygiene and clothing management.   Toilet transfer to toilet with Stand-by Assistance.  Increased functional strength in B UE grossly by 1/2 MM grade.                       History:     History reviewed. No pertinent past medical history.    History reviewed. No pertinent surgical history.    Time Tracking:     OT Date of Treatment: 11/11/22  OT Start Time: 0905  OT Stop Time: 0930  OT Total Time (min): 25 min    Billable Minutes:Evaluation 15  Therapeutic Activity 10    11/11/2022

## 2022-11-11 NOTE — PT/OT/SLP EVAL
Physical Therapy Evaluation    Patient Name:  Ayde Felix   MRN:  [id number]    Recommendations:     Discharge Recommendations:  rehabilitation facility   Discharge Equipment Recommendations: walker, rolling (TBD FURTHER AT NEXT LEVEL OF CARE)   Barriers to discharge: Inaccessible home and Decreased caregiver support    Assessment:     Ayde Felix is a 72 y.o. female admitted with a medical diagnosis of Closed nondisplaced intertrochanteric fracture of left femur.  She presents with the following impairments/functional limitations:  weakness, impaired endurance, impaired functional mobility, gait instability, impaired balance, pain, decreased safety awareness, decreased lower extremity function, decreased upper extremity function, decreased coordination.    Rehab Prognosis: Good; patient would benefit from acute skilled PT services to address these deficits and reach maximum level of function.    Recent Surgery: Procedure(s) (LRB):  ORIF, FRACTURE, FEMUR (Left) 1 Day Post-Op    Plan:     During this hospitalization, patient to be seen 3 x/week to address the identified rehab impairments via gait training, therapeutic activities, therapeutic exercises and progress toward the following goals:    Plan of Care Expires:  11/25/22    Subjective     Chief Complaint: C/O FATIGUE  Patient/Family Comments/goals:   Pain/Comfort:  Pain Rating 1: 0/10 (PT VERBALIZED NO PAIN AT REST BUT FACIAL DISTRESS APPARENT WITH MOVEMENT)  Location - Side 1: Left  Location - Orientation 1: upper  Location 1: leg  Pain Addressed 1: Distraction, Reposition    Patients cultural, spiritual, Moravian conflicts given the current situation:      Living Environment:  PT LIVES ALONE 2 STORY HOUSE BR/BA ON 2ND FLOOR, NO BEDROOMS ON 1ST FLOOR, NO STEPS TO ENTER HOME, PT AMB INDEP COMMUNITY DISTANCES, DOES NOT DRIVE, SON OR GRAND SON DRIVE FOR HER, INDEP WITH ADL'S, NO O2 USED AT HOME PTA  Prior to admission, patients level of function was INDEP.   "Equipment used at home: none.  DME owned (not currently used): none.  Upon discharge, patient will have assistance from FAMILY.    Objective:     Communicated with NURSE RAINEY prior to session.  Patient found supine with telemetry, oxygen, peripheral IV, bed alarm  upon PT entry to room.    General Precautions: Standard, fall   Orthopedic Precautions:LUE weight bearing as tolerated, LLE weight bearing as tolerated (WBAT FOR LUE WITH BRACE DONNED)   Braces:  (LUE WRIST BRACE)  Respiratory Status: Nasal cannula, flow 1 L/min    Exams:  Cognitive Exam:  Patient is oriented to Person, Place, Time, and Situation  Postural Exam:  Patient presented with the following abnormalities:    -       Rounded shoulders  Sensation:    -       Intact  RLE ROM: WNL  RLE Strength: WNL  LLE ROM: WFL except PAINFUL WITH MOVEMENT  LLE Strength: NT    Functional Mobility:  Bed Mobility:     Rolling Left:  minimum assistance  Scooting: minimum assistance  Supine to Sit: minimum assistance  Transfers:     Sit to Stand:  moderate assistance with rolling walker  Bed to Chair: moderate assistance with  rolling walker  using  Step Transfer  Gait: PT AMB 20' IN ROOM WITH RW AND MODA, CUES FOR UPRIGHT POSTURE AND RW SAFETY, MILD SOB ON ROOM AIR, STEP THROUGH GAIT PATTERN, QUICK TO FATIGUE, EDUCATED ON IMPORTANCE OF ACTIVITY PACING  Balance: POOR    AM-PAC 6 CLICK MOBILITY  Total Score:13     Treatment & Education:  PT EDUCATED IN ROLE OF P.T. AND POC IN ACUTE CARE HOSPITAL SETTING, EDUCATED IN RW USE AND SAFETY DURING TF'S AND GAIT, ENCOURAGED TO INCREASE TIME OOB IN CHAIR TO TOLERANCE, EDUCATED IN AND ENCOURAGED TO PERFORM BLE THEREX WHILE SEATED OR SUPINE THROUGHOUT THE DAY TO TOLERANCE: HIP FLEX/EXT, QUAD SET, LAQ, HEEL SLIDES, AP'S.  PT AGREEABLE TO REQUESTS  PT EDUCATED ON RISK FOR FALLS DUE TO GENERALIZED WEAKNESS, EDUCATED ON "CALL DON'T FALL", ENCOURAGED TO CALL FOR ASSISTANCE WITH ALL NEEDS SUCH AS BED<>CHAIR TRANSFERS OR TRIPS TO " BATHROOM, PT AGREEABLE TO SAFETY PRECAUTIONS    Patient left up in chair with all lines intact, call button in reach, chair alarm on, NURSE notified, and SON present.    GOALS:   Multidisciplinary Problems       Physical Therapy Goals          Problem: Physical Therapy    Goal Priority Disciplines Outcome Goal Variances Interventions   Physical Therapy Goal     PT, PT/OT      Description: LTG'S TO BE MET IN 14 DAYS (11-25-22)  PT WILL REQUIRE [facility] FOR BED MOBILITY  PT WILL REQUIRE [facility] FOR BED<>CHAIR TF'S  PT WILL  FEET WITH RW AND DERIAN                         History:     History reviewed. No pertinent past medical history.    History reviewed. No pertinent surgical history.    Time Tracking:     PT Received On: 11/11/22  PT Start Time: 0915     PT Stop Time: 0940  PT Total Time (min): 25 min     Billable Minutes: Evaluation 15 and Therapeutic Activity 10    11/11/2022

## 2022-11-11 NOTE — ASSESSMENT & PLAN NOTE
Left intertrochanteric fracture on xray  Ortho notified  Plans for surgery in am  Npo  Pain control   11/10:  --surgery today  --prn analgesia  --PT/OT after surgery  --ortho following  11/11   PT rec IPR  Continue supportive management  Consult justice management for placement

## 2022-11-11 NOTE — PLAN OF CARE
P.T. EVAL COMPLETE.  PT CURRENTLY REQUIRES [facility] FOR BED MOBILITY, MODA FOR TF'S AND GAIT WITH RW.  P.T. RECOMMENDS INPATIENT REHAB AT D/C

## 2022-11-11 NOTE — PLAN OF CARE
OT maddie completed. Sup>sit min A, sit>stand min A, functional mobility x20ft with mod A, step>pivot to bedside chair with mod A. Recommending inpatient rehab at d/c.

## 2022-11-11 NOTE — PROGRESS NOTES
Pharmacist Renal Dose Adjustment Note    Ayde Felix is a 72 y.o. female being treated with the medication Cefazolin    Patient Data:    Vital Signs (Most Recent):  Temp: 97.8 °F (36.6 °C) (11/10/22 1610)  Pulse: 89 (11/10/22 1610)  Resp: 18 (11/10/22 1610)  BP: 120/73 (11/10/22 1610)  SpO2: 96 % (11/10/22 1610) Vital Signs (72h Range):  Temp:  [97.7 °F (36.5 °C)-98.9 °F (37.2 °C)]   Pulse:  []   Resp:  [12-27]   BP: (107-193)/(62-87)   SpO2:  [95 %-99 %]      Recent Labs   Lab 11/09/22 2222   CREATININE 0.7     Serum creatinine: 0.7 mg/dL 11/09/22 2222  Estimated creatinine clearance: 55.4 mL/min    Medication:cefazolin dose: 1gm frequency q6h will be changed to medication:cefazolin dose:2gm frequency:q8h    Pharmacist's Name: Aly Goddard  Pharmacist's Extension: [phone]

## 2022-11-11 NOTE — PLAN OF CARE
[facility] - Telemetry (Hospital)  Initial Discharge Assessment       Primary Care Provider: Primary Doctor No    Admission Diagnosis: Fall [W19.XXXA]  Pre-op testing [Z01.818]  Chest pain [R07.9]  Pre-op evaluation [Z01.818]  Closed fracture of left hip, initial encounter [S72.002A]  Closed nondisplaced intertrochanteric fracture of left femur, initial encounter [S72.145A]  Closed fracture of distal end of left radius, unspecified fracture morphology, initial encounter [S52.502A]  Closed displaced intertrochanteric fracture of left femur with routine healing, subsequent encounter [S72.142D]    Admission Date: 11/9/2022  Expected Discharge Date:     Discharge Barriers Identified: None    Payor: [facility] MANAGED MCARE / Plan: [facility] DUAL COMPLETE HMO SNP / Product Type: Medicare Advantage /     Extended Emergency Contact Information  Primary Emergency Contact: Kita Melgar sr  Address: [address] [place] [address] [id number] [place] [address]  Home Phone: [phone]  Mobile Phone: [phone]  Relation: Son  Secondary Emergency Contact: kita melgar jr  Mobile Phone: [phone]  Relation: Grandchild  Preferred language: English   needed? No    Discharge Plan A: Skilled Nursing Facility  Discharge Plan B: Rehab    No Pharmacies Listed    Initial Assessment (most recent)       Adult Discharge Assessment - 11/11/22 1429          Discharge Assessment    Assessment Type Discharge Planning Assessment     Confirmed/corrected address, phone number and insurance Yes     Confirmed Demographics Correct on Facesheet     Source of Information patient     Communicated ERENDIRA with patient/caregiver Date not available/Unable to determine     Reason For Admission L femur fracture     Lives With alone     Facility Arrived From: home     Do you expect to return to your current living situation? Yes     Do you have help at home or someone to help you manage your care at home? Yes      Who are your caregiver(s) and their phone number(s)? sonClyde     Prior to hospitilization cognitive status: Alert/Oriented     Current cognitive status: Alert/Oriented     Walking or Climbing Stairs Difficulty none     Dressing/Bathing Difficulty none     Equipment Currently Used at Home none     Readmission within 30 days? No     Patient currently being followed by outpatient case management? No     Do you currently have service(s) that help you manage your care at home? No     Do you take prescription medications? Yes     Do you have prescription coverage? Yes     Do you have any problems affording any of your prescribed medications? No     Is the patient taking medications as prescribed? yes     Who is going to help you get home at discharge? family (grandson Clyde Brothers)     How do you get to doctors appointments? family or friend will provide     Are you on dialysis? No     Discharge Plan A Skilled Nursing Facility     Discharge Plan B Rehab     DME Needed Upon Discharge  none     Discharge Plan discussed with: Patient     Discharge Barriers Identified None                      Anticipated DC Dispo: SNF vs Rehab  Prior Level of Function: independent, lives alone and denies use of DME. Patient's grandson provides transportation.  PCP: Dr. Scott  Comments:  CM met with patient at bedside to introduce role and discuss d/c planning. Patient is independent, lives alone. CM provided in-network SNF list at bedside. Patient reviewed and states she has been to the [facility] before. Patient requested referral sent to this facility.

## 2022-11-11 NOTE — SUBJECTIVE & OBJECTIVE
Interval History: see hospital course     Review of Systems   Constitutional:  Negative for fever.   Respiratory:  Negative for shortness of breath.    Cardiovascular:  Negative for chest pain.   Gastrointestinal:  Negative for abdominal pain, nausea and vomiting.   Musculoskeletal:  Positive for arthralgias and gait problem.   Psychiatric/Behavioral:  Negative for behavioral problems.    Objective:     Vital Signs (Most Recent):  Temp: 98.8 °F (37.1 °C) (11/11/22 1538)  Pulse: 102 (11/11/22 1538)  Resp: 20 (11/11/22 1538)  BP: (!) 176/77 (11/11/22 1538)  SpO2: (!) 94 % (11/11/22 1538)   Vital Signs (24h Range):  Temp:  [97.8 °F (36.6 °C)-99.3 °F (37.4 °C)] 98.8 °F (37.1 °C)  Pulse:  [] 102  Resp:  [15-20] 20  SpO2:  [89 %-96 %] 94 %  BP: (120-176)/(63-81) 176/77     Weight: 48.3 kg (106 lb 7.7 oz)  Body mass index is 16.68 kg/m².    Intake/Output Summary (Last 24 hours) at 11/11/2022 1544  Last data filed at 11/11/2022 1426  Gross per 24 hour   Intake 1440 ml   Output 1480 ml   Net -40 ml      Physical Exam  Constitutional:       Appearance: She is well-developed.   HENT:      Head: Normocephalic and atraumatic.   Eyes:      Conjunctiva/sclera: Conjunctivae normal.      Pupils: Pupils are equal, round, and reactive to light.   Neck:      Vascular: No JVD.   Cardiovascular:      Rate and Rhythm: Normal rate and regular rhythm.      Heart sounds: Normal heart sounds.   Pulmonary:      Effort: Pulmonary effort is normal. No respiratory distress.      Breath sounds: No wheezing.   Abdominal:      General: Bowel sounds are normal. There is no distension.      Palpations: Abdomen is soft.      Tenderness: There is no abdominal tenderness. There is no guarding.   Musculoskeletal:         General: No tenderness. Normal range of motion.      Cervical back: Normal range of motion.   Skin:     General: Skin is warm and dry.      Capillary Refill: Capillary refill takes less than 2 seconds.   Neurological:      Mental  Status: She is alert and oriented to person, place, and time.   Psychiatric:         Behavior: Behavior normal.       Significant Labs: All pertinent labs within the past 24 hours have been reviewed.  CBC:   Recent Labs   Lab 11/09/22 2222 11/11/22 0812   WBC 10.74 9.01   HGB 9.1* 7.2*   HCT 29.7* 24.5*    189     CMP:   Recent Labs   Lab 11/09/22 2222 11/11/22 0812    135*   K 4.1 3.3*    111*   CO2 16* 13*    92   BUN 11 9   CREATININE 0.7 0.6   CALCIUM 9.1 8.2*   PROT 7.1  --    ALBUMIN 4.1  --    BILITOT 0.5  --    ALKPHOS 75  --    AST 19  --    ALT 8*  --    ANIONGAP 12 11     Urine Studies:   Recent Labs   Lab 11/10/22  0029   COLORU Yellow   APPEARANCEUA Clear   PHUR 6.0   SPECGRAV 1.020   PROTEINUA Negative   GLUCUA Negative   KETONESU 1+*   BILIRUBINUA Negative   OCCULTUA Negative   NITRITE Negative   UROBILINOGEN Negative   LEUKOCYTESUR Negative       Significant Imaging: I have reviewed all pertinent imaging results/findings within the past 24 hours.

## 2022-11-11 NOTE — PROGRESS NOTES
Ayde Felix is a 72 y.o. female patient.     Subjective    The patient is 1st postop day from repair of left intertrochanteric femur fracture with intramedullary nail.  She says her pain is improved from preop.  She is able to turn herself in the bed.    1. Closed displaced intertrochanteric fracture of left femur with routine healing, subsequent encounter    2. Fall    3. Pre-op testing    4. Pre-op evaluation    5. Closed fracture of left hip, initial encounter    6. Closed fracture of distal end of left radius, unspecified fracture morphology, initial encounter    7. Closed nondisplaced intertrochanteric fracture of left femur, initial encounter    8. Chest pain    9. Closed nondisplaced intertrochanteric fracture of left femur with routine healing, subsequent encounter      History reviewed. No pertinent past medical history.  No past surgical history pertinent negatives on file.  Scheduled Meds:   aspirin  81 mg Oral BID    busPIRone  10 mg Oral TID    ceFAZolin (ANCEF) IVPB  2 g Intravenous Q8H    chlorhexidine  10 mL Mouth/Throat BID    mirtazapine  30 mg Oral QHS    mupirocin   Nasal BID    polyethylene glycol  17 g Oral BID     Continuous Infusions:   sodium chloride 0.9% 75 mL/hr at 11/10/22 1627     PRN Meds:acetaminophen, aluminum-magnesium hydroxide-simethicone, diphenhydrAMINE, guaiFENesin 100 mg/5 ml, HYDROcodone-acetaminophen, magnesium oxide, magnesium oxide, melatonin, morphine, naloxone, ondansetron, potassium bicarbonate, potassium bicarbonate, potassium bicarbonate, potassium, sodium phosphates, potassium, sodium phosphates, potassium, sodium phosphates, promethazine, simethicone, sodium chloride 0.9%    Review of patient's allergies indicates:  No Known Allergies  Active Hospital Problems    Diagnosis  POA    *Closed nondisplaced intertrochanteric fracture of left femur [S72.764A]  Yes    Closed fracture of distal end of left radius [S52.234A]  Yes    CHARO (generalized anxiety disorder)  "[F41.1]  Yes      Resolved Hospital Problems   No resolved problems to display.       Objective:  Vital signs (most recent): Blood pressure (!) 144/67, pulse 72, temperature 98.2 °F (36.8 °C), temperature source Oral, resp. rate 18, height 5' 7" (1.702 m), weight 48.3 kg (106 lb 7.7 oz), SpO2 (!) 93 %, not currently breastfeeding.    Well-developed well-nourished female in no acute distress.  She is awake, alert, oriented, cooperative with evaluation.      Left lower extremity with mild swelling of the mid thigh area.  Minimal tenderness.  Aquacel dressing intact.  The limb is neurovascularly intact.    Laboratory studies pending     Assessment & Plan       The patient is doing well 1st postop day from repair of left intertrochanteric femur fracture.  We will begin occupational and physical therapy.  Start aspirin 81 mg b.i.d. for DVT prophylaxis.  Weight bear as tolerated left lower extremity.      Plan aspirin for 30 days.  Orthopedic clinic follow-up in 2 weeks.  Social work consult for discharge planning and DME    11/11/2022        Deni Lynn MD, FAAOS [facility], Orthopedic Trauma Service  [place]    "

## 2022-11-11 NOTE — PROGRESS NOTES
[facility] - Telemetry ([facility])  [facility] Medicine  Progress Note    Patient Name: Ayde Felix  MRN: [id number]  Patient Class: IP- Inpatient   Admission Date: 11/9/2022  Length of Stay: 2 days  Attending Physician: Dat Heath, *  Primary Care Provider: Primary Doctor No        Subjective:     Principal Problem:Closed nondisplaced intertrochanteric fracture of left femur        HPI:  Patient is a 72 y.o. aa female with a PMH of CHARO who presents to the Emergency Department for evaluation of left wrist and LLE pain secondary to fall at 13:00. Patient suffered a fall when she was walking upstairs. Currently complains of left wrist and left hip pain however improving. Denies any other issues.     In the ED, xrays revealed left distal radial fracture and left intertrochanteric fracture. Labs showed an h/h 9.1/29.7 otherwise unremarkable. Ortho consulted who recommended admission to  and plans for surgery in the am.       Overview/Hospital Course:  Patient was admitted for closed nondisplaced intertrochanteric fx of L femur under the care of [facility] medicine. Orthopedic surgery was consulted. 11/10:  Pt scheduled for surgery today at 1pm. Currently NPO - quezada cath and IV pain medication ordered  11/11 POD #2 Patient sitting up in chair at bedside,  reports feeling well, pain controlled. PT rec IPR, case management consulted. Labs reviewed- Hbg trending down, monitor closely, will transfuse for Hbg < 7.       Interval History: see hospital course     Review of Systems   Constitutional:  Negative for fever.   Respiratory:  Negative for shortness of breath.    Cardiovascular:  Negative for chest pain.   Gastrointestinal:  Negative for abdominal pain, nausea and vomiting.   Musculoskeletal:  Positive for arthralgias and gait problem.   Psychiatric/Behavioral:  Negative for behavioral problems.    Objective:     Vital Signs (Most Recent):  Temp: 98.8 °F (37.1 °C) (11/11/22 1538)  Pulse: 102 (11/11/22  1538)  Resp: 20 (11/11/22 1538)  BP: (!) 176/77 (11/11/22 1538)  SpO2: (!) 94 % (11/11/22 1538)   Vital Signs (24h Range):  Temp:  [97.8 °F (36.6 °C)-99.3 °F (37.4 °C)] 98.8 °F (37.1 °C)  Pulse:  [] 102  Resp:  [15-20] 20  SpO2:  [89 %-96 %] 94 %  BP: (120-176)/(63-81) 176/77     Weight: 48.3 kg (106 lb 7.7 oz)  Body mass index is 16.68 kg/m².    Intake/Output Summary (Last 24 hours) at 11/11/2022 1544  Last data filed at 11/11/2022 1426  Gross per 24 hour   Intake 1440 ml   Output 1480 ml   Net -40 ml      Physical Exam  Constitutional:       Appearance: She is well-developed.   HENT:      Head: Normocephalic and atraumatic.   Eyes:      Conjunctiva/sclera: Conjunctivae normal.      Pupils: Pupils are equal, round, and reactive to light.   Neck:      Vascular: No JVD.   Cardiovascular:      Rate and Rhythm: Normal rate and regular rhythm.      Heart sounds: Normal heart sounds.   Pulmonary:      Effort: Pulmonary effort is normal. No respiratory distress.      Breath sounds: No wheezing.   Abdominal:      General: Bowel sounds are normal. There is no distension.      Palpations: Abdomen is soft.      Tenderness: There is no abdominal tenderness. There is no guarding.   Musculoskeletal:         General: No tenderness. Normal range of motion.      Cervical back: Normal range of motion.   Skin:     General: Skin is warm and dry.      Capillary Refill: Capillary refill takes less than 2 seconds.   Neurological:      Mental Status: She is alert and oriented to person, place, and time.   Psychiatric:         Behavior: Behavior normal.       Significant Labs: All pertinent labs within the past 24 hours have been reviewed.  CBC:   Recent Labs   Lab 11/09/22 2222 11/11/22 0812   WBC 10.74 9.01   HGB 9.1* 7.2*   HCT 29.7* 24.5*    189     CMP:   Recent Labs   Lab 11/09/22 2222 11/11/22 0812    135*   K 4.1 3.3*    111*   CO2 16* 13*    92   BUN 11 9   CREATININE 0.7 0.6   CALCIUM 9.1 8.2*    PROT 7.1  --    ALBUMIN 4.1  --    BILITOT 0.5  --    ALKPHOS 75  --    AST 19  --    ALT 8*  --    ANIONGAP 12 11     Urine Studies:   Recent Labs   Lab 11/10/22  0029   COLORU Yellow   APPEARANCEUA Clear   PHUR 6.0   SPECGRAV 1.020   PROTEINUA Negative   GLUCUA Negative   KETONESU 1+*   BILIRUBINUA Negative   OCCULTUA Negative   NITRITE Negative   UROBILINOGEN Negative   LEUKOCYTESUR Negative       Significant Imaging: I have reviewed all pertinent imaging results/findings within the past 24 hours.      Assessment/Plan:      * Closed nondisplaced intertrochanteric fracture of left femur  Left intertrochanteric fracture on xray  Ortho notified  Plans for surgery in am  Npo  Pain control   11/10:  --surgery today  --prn analgesia  --PT/OT after surgery  --ortho following  11/11   PT rec IPR  Continue supportive management  Consult justice management for placement     CHARO (generalized anxiety disorder)  Resume home buspar      Closed fracture of distal end of left radius  Currently in splint  Pain controlled        VTE Risk Mitigation (From admission, onward)         Ordered     Reason for No Pharmacological VTE Prophylaxis  Once        Question:  Reasons:  Answer:  Physician Provided (leave comment)    11/10/22 1208     Place sequential compression device  Until discontinued         11/10/22 0255                Discharge Planning   ERENDIRA:  11/14/22    Code Status: Full Code   Is the patient medically ready for discharge?:     Reason for patient still in hospital (select all that apply): Treatment and Pending disposition  Discharge Plan A: Skilled Nursing Facility                  Thu Thomson NP  Department of Hospital Medicine   [facility] - Telemetry ([facility])

## 2022-11-12 PROCEDURE — 99024 POSTOP FOLLOW-UP VISIT: CPT | Mod: ,,, | Performed by: ORTHOPAEDIC SURGERY

## 2022-11-12 PROCEDURE — 94761 N-INVAS EAR/PLS OXIMETRY MLT: CPT

## 2022-11-12 PROCEDURE — 25000003 PHARM REV CODE 250: Performed by: PHYSICIAN ASSISTANT

## 2022-11-12 PROCEDURE — 97116 GAIT TRAINING THERAPY: CPT | Mod: CQ

## 2022-11-12 PROCEDURE — 99024 PR POST-OP FOLLOW-UP VISIT: ICD-10-PCS | Mod: ,,, | Performed by: ORTHOPAEDIC SURGERY

## 2022-11-12 PROCEDURE — 27000221 HC OXYGEN, UP TO 24 HOURS

## 2022-11-12 PROCEDURE — 25000003 PHARM REV CODE 250: Performed by: FAMILY MEDICINE

## 2022-11-12 PROCEDURE — 63600175 PHARM REV CODE 636 W HCPCS: Performed by: STUDENT IN AN ORGANIZED HEALTH CARE EDUCATION/TRAINING PROGRAM

## 2022-11-12 PROCEDURE — 97110 THERAPEUTIC EXERCISES: CPT | Mod: CQ

## 2022-11-12 PROCEDURE — 21400001 HC TELEMETRY ROOM

## 2022-11-12 RX ADMIN — POLYETHYLENE GLYCOL 3350 17 G: 17 POWDER, FOR SOLUTION ORAL at 08:11

## 2022-11-12 RX ADMIN — SODIUM CHLORIDE: 0.9 INJECTION, SOLUTION INTRAVENOUS at 08:11

## 2022-11-12 RX ADMIN — CEFAZOLIN SODIUM 2 G: 2 SOLUTION INTRAVENOUS at 02:11

## 2022-11-12 RX ADMIN — CHLORHEXIDINE GLUCONATE 0.12% ORAL RINSE 10 ML: 1.2 LIQUID ORAL at 08:11

## 2022-11-12 RX ADMIN — HYDROCODONE BITARTRATE AND ACETAMINOPHEN 1 TABLET: 5; 325 TABLET ORAL at 02:11

## 2022-11-12 RX ADMIN — MUPIROCIN: 20 OINTMENT TOPICAL at 10:11

## 2022-11-12 RX ADMIN — BUSPIRONE HYDROCHLORIDE 10 MG: 10 TABLET ORAL at 04:11

## 2022-11-12 RX ADMIN — BUSPIRONE HYDROCHLORIDE 10 MG: 10 TABLET ORAL at 10:11

## 2022-11-12 RX ADMIN — MIRTAZAPINE 30 MG: 15 TABLET, FILM COATED ORAL at 10:11

## 2022-11-12 RX ADMIN — BUSPIRONE HYDROCHLORIDE 10 MG: 10 TABLET ORAL at 08:11

## 2022-11-12 RX ADMIN — HYDROCODONE BITARTRATE AND ACETAMINOPHEN 1 TABLET: 5; 325 TABLET ORAL at 06:11

## 2022-11-12 RX ADMIN — ACETAMINOPHEN 650 MG: 325 TABLET ORAL at 08:11

## 2022-11-12 RX ADMIN — ASPIRIN 81 MG CHEWABLE TABLET 81 MG: 81 TABLET CHEWABLE at 10:11

## 2022-11-12 RX ADMIN — CEFAZOLIN SODIUM 2 G: 2 SOLUTION INTRAVENOUS at 09:11

## 2022-11-12 RX ADMIN — ACETAMINOPHEN 650 MG: 325 TABLET ORAL at 10:11

## 2022-11-12 RX ADMIN — CHLORHEXIDINE GLUCONATE 0.12% ORAL RINSE 10 ML: 1.2 LIQUID ORAL at 09:11

## 2022-11-12 RX ADMIN — ASPIRIN 81 MG CHEWABLE TABLET 81 MG: 81 TABLET CHEWABLE at 08:11

## 2022-11-12 RX ADMIN — POLYETHYLENE GLYCOL 3350 17 G: 17 POWDER, FOR SOLUTION ORAL at 10:11

## 2022-11-12 RX ADMIN — MUPIROCIN: 20 OINTMENT TOPICAL at 08:11

## 2022-11-12 NOTE — PLAN OF CARE
Ongoing (interventions implemented as appropriate)  Pt is alert and oriented.  VSS  Pt able to make needs known.  Pt remained afebrile throughout this shift.   Pt remained free of falls this shift.   Prn pain medication given.  Plan of care reviewed. Patient verbalizes understanding.   Pt moving/turing independent. Frequent weight shifting encouraged.  Patient normal sinus rhythm on monitor.   Bed low, side rails up x 2, wheels locked, call light in reach.   Hourly rounding completed.   Will continue to observe.

## 2022-11-12 NOTE — CONSULTS
Thank you for your consult to [facility]. We have reviewed the patient chart. This patient does meet criteria for [facility] service at this time. Will assume care on 11/12/22 at 7AM

## 2022-11-12 NOTE — PT/OT/SLP PROGRESS
Physical Therapy  Treatment    Ayde Felix   MRN: [id number]   Admitting Diagnosis: Closed nondisplaced intertrochanteric fracture of left femur       PT Start Time: 0905     PT Stop Time: 0930    PT Total Time (min): 25 min       Billable Minutes:  Gait Training 15 and Therapeutic Activity 10    Treatment Type: Treatment  PT/PTA: PTA     PTA Visit Number: 1       General Precautions: Standard, fall  Orthopedic Precautions: LLE weight bearing as tolerated, LUE weight bearing as tolerated   Braces: UE brace  Respiratory Status: Nasal cannula, flow 1 L/min         Subjective:  Communicated with NIVIA MARTIN prior to session.      Pain/Comfort  Pain Rating 1: 0/10  Pain Rating Post-Intervention 1: 0/10    Objective:   Patient found with: peripheral IV, oxygen      Treatment and Education:    BED MOB SBA    SCOOT TO EOB SBA VC FOR UPPER EXTREMITY PLACEMENT    SIT<-->STAND SBA WITH VC FOR UPPER EXTREMITY PLACEMENT     RW WITHOUT O2 2 X 15' MIN A. AFTER RETURNING TO SITTING, PT BECAME DIZZY AND VOMITED. NSG AWARE AS SHE WAS IN THE ROOM AS WELL. RETURNED TO WALL O2 AT 1L.      AM-PAC 6 CLICK MOBILITY  How much help from another person does this patient currently need?   1 = Unable, Total/Dependent Assistance  2 = A lot, Maximum/Moderate Assistance  3 = A little, Minimum/Contact Guard/Supervision  4 = None, Modified [place]/Independent    Turning over in bed (including adjusting bedclothes, sheets and blankets)?: 4  Sitting down on and standing up from a chair with arms (e.g., wheelchair, bedside commode, etc.): 3  Moving from lying on back to sitting on the side of the bed?: 4  Moving to and from a bed to a chair (including a wheelchair)?: 3  Need to walk in hospital room?: 3  Climbing 3-5 steps with a railing?: 1  Basic Mobility Total Score: 18    AM-PAC Raw Score CMS G-Code Modifier Level of Impairment Assistance   6 % Total / Unable   7 - 9 CM 80 - 100% Maximal Assist   10 - 14 CL 60 - 80% Moderate Assist    15 - 19 CK 40 - 60% Moderate Assist   20 - 22 CJ 20 - 40% Minimal Assist   23 CI 1-20% SBA / CGA   24 CH 0% Independent/ Mod I     Patient left up in chair with all lines intact, call button in reach, chair alarm on, NSG notified, and SON present.    Assessment:  Ayde Felix is a 72 y.o. female with a medical diagnosis of Closed nondisplaced intertrochanteric fracture of left femur and presents with .    Rehab identified problem list/impairments: Rehab identified problem list/impairments: weakness, gait instability, decreased upper extremity function, decreased lower extremity function, impaired balance, impaired endurance, decreased safety awareness, impaired self care skills, impaired functional mobility    Rehab potential is good.    Activity tolerance: Good    Discharge recommendations: Discharge Facility/Level of Care Needs: rehabilitation facility     Barriers to discharge:      Equipment recommendations: Equipment Needed After Discharge: bedside commode, wheelchair, walker, rolling     GOALS:   Multidisciplinary Problems       Physical Therapy Goals          Problem: Physical Therapy    Goal Priority Disciplines Outcome Goal Variances Interventions   Physical Therapy Goal     PT, PT/OT      Description: LTG'S TO BE MET IN 14 DAYS (11-25-22)  PT WILL REQUIRE CGA FOR BED MOBILITY  PT WILL REQUIRE DERIAN FOR BED<>CHAIR TF'S  PT WILL  FEET WITH RW AND DERIAN                         PLAN:    Patient to be seen 3 x/week  to address the above listed problems via gait training, therapeutic activities, therapeutic exercises  Plan of Care expires: 11/25/22  Plan of Care reviewed with: patient         11/12/2022

## 2022-11-12 NOTE — PROGRESS NOTES
Ayde Felix is a 72 y.o. female patient.     Subjective    The patient is 2nd postop day from left intertrochanteric femur fracture repair.  She reports her pain is improving.  She was up walking with the physical therapist and was able to make it into the hallway.    1. Closed displaced intertrochanteric fracture of left femur with routine healing, subsequent encounter    2. Fall    3. Pre-op testing    4. Pre-op evaluation    5. Closed fracture of left hip, initial encounter    6. Closed fracture of distal end of left radius, unspecified fracture morphology, initial encounter    7. Closed nondisplaced intertrochanteric fracture of left femur, initial encounter    8. Chest pain    9. Closed nondisplaced intertrochanteric fracture of left femur with routine healing, subsequent encounter      History reviewed. No pertinent past medical history.  No past surgical history pertinent negatives on file.  Scheduled Meds:   aspirin  81 mg Oral BID    busPIRone  10 mg Oral TID    chlorhexidine  10 mL Mouth/Throat BID    mirtazapine  30 mg Oral QHS    mupirocin   Nasal BID    polyethylene glycol  17 g Oral BID     Continuous Infusions:   sodium chloride 0.9% 75 mL/hr at 11/12/22 0815     PRN Meds:acetaminophen, aluminum-magnesium hydroxide-simethicone, diphenhydrAMINE, guaiFENesin 100 mg/5 ml, HYDROcodone-acetaminophen, magnesium oxide, magnesium oxide, melatonin, morphine, naloxone, ondansetron, potassium bicarbonate, potassium bicarbonate, potassium bicarbonate, potassium, sodium phosphates, potassium, sodium phosphates, potassium, sodium phosphates, promethazine, simethicone, sodium chloride 0.9%    Review of patient's allergies indicates:  No Known Allergies  Active Hospital Problems    Diagnosis  POA    *Closed nondisplaced intertrochanteric fracture of left femur [S72.145A]  Yes    Closed fracture of distal end of left radius [S52.502A]  Yes    CHARO (generalized anxiety disorder) [F41.1]  Yes      Resolved Hospital  "Problems   No resolved problems to display.       Objective:  Vital signs (most recent): Blood pressure (!) 155/74, pulse 84, temperature 98 °F (36.7 °C), temperature source Axillary, resp. rate 18, height 5' 7" (1.702 m), weight 51.9 kg (114 lb 6.7 oz), SpO2 95 %, not currently breastfeeding.    Well-developed thin female in no acute distress.  She is awake, alert, oriented, cooperative with evaluation.      Left hip/thigh Aquacel dressing is intact.  Minimal tenderness.  No swelling.  Moves the ankle and foot well.     Assessment & Plan       The patient is doing well.  Await rehab disposition.  She is completed her prophylactic antibiotics.    Continue occupational and physical therapy.  Weightbearing as tolerated.  Aspirin 81 mg b.i.d. for 30 days as DVT prophylaxis.  Return to the [facility] 2 weeks postop for staple removals.    11/12/2022        Deni Lynn MD, FAAOS [facility], Orthopedic Trauma Service  [place]    "

## 2022-11-12 NOTE — PLAN OF CARE
BED MOB SBA    SCOOT TO EOB SBA VC FOR UPPER EXTREMITY PLACEMENT    SIT<-->STAND SBA WITH VC FOR UPPER EXTREMITY PLACEMENT     RW WITHOUT O2 2 X 15' MIN A. AFTER RETURNING TO SITTING, PT BECAME DIZZY AND VOMITED. NSG AWARE AS SHE WAS IN THE ROOM AS WELL. RETURNED TO WALL O2 AT 1L.

## 2022-11-13 PROCEDURE — 97535 SELF CARE MNGMENT TRAINING: CPT

## 2022-11-13 PROCEDURE — 97116 GAIT TRAINING THERAPY: CPT | Mod: CQ

## 2022-11-13 PROCEDURE — 25000003 PHARM REV CODE 250: Performed by: PHYSICIAN ASSISTANT

## 2022-11-13 PROCEDURE — 97530 THERAPEUTIC ACTIVITIES: CPT | Mod: CQ

## 2022-11-13 PROCEDURE — 94761 N-INVAS EAR/PLS OXIMETRY MLT: CPT

## 2022-11-13 PROCEDURE — 99024 POSTOP FOLLOW-UP VISIT: CPT | Mod: ,,, | Performed by: ORTHOPAEDIC SURGERY

## 2022-11-13 PROCEDURE — 25000003 PHARM REV CODE 250: Performed by: STUDENT IN AN ORGANIZED HEALTH CARE EDUCATION/TRAINING PROGRAM

## 2022-11-13 PROCEDURE — 27000221 HC OXYGEN, UP TO 24 HOURS

## 2022-11-13 PROCEDURE — 97530 THERAPEUTIC ACTIVITIES: CPT

## 2022-11-13 PROCEDURE — 99900035 HC TECH TIME PER 15 MIN (STAT)

## 2022-11-13 PROCEDURE — 21400001 HC TELEMETRY ROOM

## 2022-11-13 PROCEDURE — 25000003 PHARM REV CODE 250: Performed by: FAMILY MEDICINE

## 2022-11-13 PROCEDURE — 99024 PR POST-OP FOLLOW-UP VISIT: ICD-10-PCS | Mod: ,,, | Performed by: ORTHOPAEDIC SURGERY

## 2022-11-13 RX ADMIN — CHLORHEXIDINE GLUCONATE 0.12% ORAL RINSE 10 ML: 1.2 LIQUID ORAL at 08:11

## 2022-11-13 RX ADMIN — MIRTAZAPINE 30 MG: 15 TABLET, FILM COATED ORAL at 08:11

## 2022-11-13 RX ADMIN — CHLORHEXIDINE GLUCONATE 0.12% ORAL RINSE 10 ML: 1.2 LIQUID ORAL at 09:11

## 2022-11-13 RX ADMIN — MUPIROCIN: 20 OINTMENT TOPICAL at 08:11

## 2022-11-13 RX ADMIN — ASPIRIN 81 MG CHEWABLE TABLET 81 MG: 81 TABLET CHEWABLE at 08:11

## 2022-11-13 RX ADMIN — HYDROCODONE BITARTRATE AND ACETAMINOPHEN 1 TABLET: 5; 325 TABLET ORAL at 05:11

## 2022-11-13 RX ADMIN — ACETAMINOPHEN 650 MG: 325 TABLET ORAL at 01:11

## 2022-11-13 RX ADMIN — ASPIRIN 81 MG CHEWABLE TABLET 81 MG: 81 TABLET CHEWABLE at 09:11

## 2022-11-13 RX ADMIN — SODIUM CHLORIDE: 0.9 INJECTION, SOLUTION INTRAVENOUS at 12:11

## 2022-11-13 RX ADMIN — BUSPIRONE HYDROCHLORIDE 10 MG: 10 TABLET ORAL at 03:11

## 2022-11-13 RX ADMIN — BUSPIRONE HYDROCHLORIDE 10 MG: 10 TABLET ORAL at 09:11

## 2022-11-13 RX ADMIN — BUSPIRONE HYDROCHLORIDE 10 MG: 10 TABLET ORAL at 08:11

## 2022-11-13 RX ADMIN — ACETAMINOPHEN 650 MG: 325 TABLET ORAL at 08:11

## 2022-11-13 RX ADMIN — MUPIROCIN: 20 OINTMENT TOPICAL at 09:11

## 2022-11-13 NOTE — PT/OT/SLP PROGRESS
Physical Therapy  Treatment    Ayde Felix   MRN: [id number]   Admitting Diagnosis: Closed nondisplaced intertrochanteric fracture of left femur       PT Start Time: 0755     PT Stop Time: 0820    PT Total Time (min): 25 min       Billable Minutes:  Gait Training 15 and Therapeutic Activity 10    Treatment Type: Treatment  PT/PTA: PTA     PTA Visit Number: 2       General Precautions: Standard, fall  Orthopedic Precautions: LLE weight bearing as tolerated, LUE weight bearing as tolerated   Braces: UE brace     Subjective:  Communicated with NIVIA HOWARD prior to session.      Pain/Comfort  Pain Rating 1: 0/10  Pain Rating Post-Intervention 1: 0/10    Objective:   Patient found with: telemetry, PureWick, peripheral IV    Treatment and Education:    BED MOB MIN A WITH MANAGEMENT OF LOWER EXTREMITY LOWER EXTREMITY     SIT<-->STAND MIN A     RW 2 X 20' MIN A     AM-PAC 6 CLICK MOBILITY  How much help from another person does this patient currently need?   1 = Unable, Total/Dependent Assistance  2 = A lot, Maximum/Moderate Assistance  3 = A little, Minimum/Contact Guard/Supervision  4 = None, Modified [place]/Independent    Turning over in bed (including adjusting bedclothes, sheets and blankets)?: 3  Sitting down on and standing up from a chair with arms (e.g., wheelchair, bedside commode, etc.): 3  Moving from lying on back to sitting on the side of the bed?: 3  Moving to and from a bed to a chair (including a wheelchair)?: 3  Need to walk in hospital room?: 3  Climbing 3-5 steps with a railing?: 1  Basic Mobility Total Score: 16    AM-PAC Raw Score CMS G-Code Modifier Level of Impairment Assistance   6 % Total / Unable   7 - 9 CM 80 - 100% Maximal Assist   10 - 14 CL 60 - 80% Moderate Assist   15 - 19 CK 40 - 60% Moderate Assist   20 - 22 CJ 20 - 40% Minimal Assist   23 CI 1-20% SBA / CGA   24 CH 0% Independent/ Mod I     Patient left up in chair with all lines intact, call button in reach, chair alarm on,  and NSG notified.    Assessment:  Ayde Felix is a 72 y.o. female with a medical diagnosis of Closed nondisplaced intertrochanteric fracture of left femur and presents with .    Rehab identified problem list/impairments: Rehab identified problem list/impairments: weakness, gait instability, decreased upper extremity function, impaired endurance, decreased lower extremity function, impaired self care skills, impaired functional mobility    Rehab potential is good.    Activity tolerance: Good    Discharge recommendations: Discharge Facility/Level of Care Needs: rehabilitation facility     Barriers to discharge:      Equipment recommendations: Equipment Needed After Discharge: walker, rolling, wheelchair, bedside commode     GOALS:   Multidisciplinary Problems       Physical Therapy Goals          Problem: Physical Therapy    Goal Priority Disciplines Outcome Goal Variances Interventions   Physical Therapy Goal     PT, PT/OT      Description: LTG'S TO BE MET IN 14 DAYS (11-25-22)  PT WILL REQUIRE [facility] FOR BED MOBILITY  PT WILL REQUIRE [facility] FOR BED<>CHAIR TF'S  PT WILL  FEET WITH RW AND DERIAN                         PLAN:    Patient to be seen 3 x/week  to address the above listed problems via gait training, therapeutic activities, therapeutic exercises  Plan of Care expires: 11/25/22  Plan of Care reviewed with: patient         11/13/2022

## 2022-11-13 NOTE — PROGRESS NOTES
Ayde Felix is a 72 y.o. female patient.     Subjective    The patient is 3 days postop repair of left intertrochanteric fracture.  Comfort level is improving significantly.  She is working with occupational and physical therapy.    1. Closed displaced intertrochanteric fracture of left femur with routine healing, subsequent encounter    2. Fall    3. Pre-op testing    4. Pre-op evaluation    5. Closed fracture of left hip, initial encounter    6. Closed fracture of distal end of left radius, unspecified fracture morphology, initial encounter    7. Closed nondisplaced intertrochanteric fracture of left femur, initial encounter    8. Chest pain    9. Closed nondisplaced intertrochanteric fracture of left femur with routine healing, subsequent encounter      History reviewed. No pertinent past medical history.  No past surgical history pertinent negatives on file.  Scheduled Meds:   aspirin  81 mg Oral BID    busPIRone  10 mg Oral TID    chlorhexidine  10 mL Mouth/Throat BID    mirtazapine  30 mg Oral QHS    mupirocin   Nasal BID    polyethylene glycol  17 g Oral BID     Continuous Infusions:   sodium chloride 0.9% 75 mL/hr at 11/12/22 0815     PRN Meds:acetaminophen, aluminum-magnesium hydroxide-simethicone, diphenhydrAMINE, guaiFENesin 100 mg/5 ml, HYDROcodone-acetaminophen, magnesium oxide, magnesium oxide, melatonin, morphine, naloxone, ondansetron, potassium bicarbonate, potassium bicarbonate, potassium bicarbonate, potassium, sodium phosphates, potassium, sodium phosphates, potassium, sodium phosphates, promethazine, simethicone, sodium chloride 0.9%    Review of patient's allergies indicates:  No Known Allergies  Active Hospital Problems    Diagnosis  POA    *Closed nondisplaced intertrochanteric fracture of left femur [S72.145A]  Yes    Closed fracture of distal end of left radius [S52.502A]  Yes    CHARO (generalized anxiety disorder) [F41.1]  Yes      Resolved Hospital Problems   No resolved problems to  "display.       Objective:  Vital signs (most recent): Blood pressure (!) 164/74, pulse 84, temperature 99 °F (37.2 °C), temperature source Oral, resp. rate 18, height 5' 7" (1.702 m), weight 50.5 kg (111 lb 5.3 oz), SpO2 96 %, not currently breastfeeding.    Well-developed well nourished thin female in no acute distress.  She is awake, alert, oriented, cooperative with evaluation.  Sitting on the bedside working with therapy.      Left hip dressing remains dry and intact.  Left lower extremity is neurovascularly intact without distal swelling or edema.     Assessment & Plan       The patient is doing well.  Anticipate she needs discharge in 2 or 3 days to skilled nursing facility.  Her insurance will not cover inpatient rehab.  Had a long discussion with her son about this option and he is going to continue to look into facilities with the assistance of case management.    11/13/2022        Deni Lynn MD, FAAOS [facility], Orthopedic Trauma Service  [place]    "

## 2022-11-13 NOTE — PLAN OF CARE
BED MOB MIN A WITH MANAGEMENT OF LOWER EXTREMITY LOWER EXTREMITY     SIT<-->STAND MIN A     RW 2 X 20' MIN A

## 2022-11-13 NOTE — PT/OT/SLP PROGRESS
Occupational Therapy   Treatment    Name: Ayde Felix  MRN: [id number]  Admitting Diagnosis:  Closed nondisplaced intertrochanteric fracture of left femur  3 Days Post-Op    Recommendations:     Discharge Recommendations: rehabilitation facility  Discharge Equipment Recommendations:  other (see comments) (TBD AT NEXT LEVEL OF CARE)  Barriers to discharge:  Decreased caregiver support, Inaccessible home environment    Assessment:     Ayde Felix is a 72 y.o. female with a medical diagnosis of Closed nondisplaced intertrochanteric fracture of left femur.  She presents with generalized weakness and debility. Performance deficits affecting function are weakness, impaired endurance, impaired self care skills, impaired functional mobility, decreased lower extremity function, impaired balance.     Rehab Prognosis:  Good; patient would benefit from acute skilled OT services to address these deficits and reach maximum level of function.       Plan:     Patient to be seen 2 x/week to address the above listed problems via self-care/home management, therapeutic activities, therapeutic exercises  Plan of Care Expires: 11/25/22  Plan of Care Reviewed with: patient, son    Subjective     Pain/Comfort:  Pain Rating 1: 0/10    Objective:     Communicated with: Nurse prior to session.  Patient found supine with peripheral IV, oxygen, PureWick upon OT entry to room.    General Precautions: Standard, fall   Orthopedic Precautions:LLE weight bearing as tolerated, LUE weight bearing as tolerated   Braces: UE brace  Respiratory Status: Nasal cannula, flow 1 L/min     Occupational Performance:     Bed Mobility:    Patient completed Rolling/Turning to Right with minimum assistance  Patient completed Scooting/Bridging with supervision  Patient completed Supine to Sit with minimum assistance     Functional Mobility/Transfers:  Patient completed Sit <> Stand Transfer with contact guard assistance  with  rolling walker   Functional  Mobility: Pt completed functional mobility with supervision using RW ~25 feet.    Activities of Daily Living:  Lower Body Dressing: moderate assistance to don/doff socks. Assistance needed to don L sock while seated EOB       AMPAC 6 Click ADL: 20    Treatment & Education:  Pt tolerated tx well. Pt educated on benefits of actively participating in occupational therapy and POC. Pt educated on fall prevention and use of call button for assistance. Pt educated on the benefits of OOB ax to prevent blood clots, PNA and improve ax tolerance. Pt encouraged to complete B UE therex throughout day to improve endurance and muscle strength. Pt verbalized understanding of all education.      Patient left up in chair with all lines intact, call button in reach, chair alarm on, and son present    GOALS:   Multidisciplinary Problems       Occupational Therapy Goals          Problem: Occupational Therapy    Goal Priority Disciplines Outcome Interventions   Occupational Therapy Goal     OT, PT/OT Ongoing, Progressing    Description: Goals to be met by: 11/25/22     Patient will increase functional independence with ADLs by performing:    Toileting from toilet with Stand-by Assistance for hygiene and clothing management.   Toilet transfer to toilet with Stand-by Assistance.  Increased functional strength in B UE grossly by 1/2 MM grade.                       Time Tracking:     OT Date of Treatment: 11/13/22  OT Start Time: 1026  OT Stop Time: 1052  OT Total Time (min): 26 min    Billable Minutes:Self Care/Home Management 15 Mins  Therapeutic Activity 11 Mins    OT/MIRTHA: OT          11/13/2022

## 2022-11-13 NOTE — PLAN OF CARE
ROLANDO. Patient rested overnight. Patient reports no pain. Patient weight shifting in bed frequently. Patient voiced some frustration regarding change in functional status. Surgical dressing WNL. Continue current plan of care.  Problem: Adult Inpatient Plan of Care  Goal: Plan of Care Review  Outcome: Ongoing, Progressing  Goal: Patient-Specific Goal (Individualized)  Outcome: Ongoing, Progressing  Goal: Absence of Hospital-Acquired Illness or Injury  Outcome: Ongoing, Progressing  Goal: Optimal Comfort and Wellbeing  Outcome: Ongoing, Progressing  Goal: Readiness for Transition of Care  Outcome: Ongoing, Progressing     Problem: Infection  Goal: Absence of Infection Signs and Symptoms  Outcome: Ongoing, Progressing     Problem: Skin Injury Risk Increased  Goal: Skin Health and Integrity  Outcome: Ongoing, Progressing     Problem: Fall Injury Risk  Goal: Absence of Fall and Fall-Related Injury  Outcome: Ongoing, Progressing

## 2022-11-14 PROCEDURE — 97530 THERAPEUTIC ACTIVITIES: CPT | Mod: CQ

## 2022-11-14 PROCEDURE — 63600175 PHARM REV CODE 636 W HCPCS: Performed by: PHYSICIAN ASSISTANT

## 2022-11-14 PROCEDURE — 99024 PR POST-OP FOLLOW-UP VISIT: ICD-10-PCS | Mod: ,,, | Performed by: ORTHOPAEDIC SURGERY

## 2022-11-14 PROCEDURE — 25000003 PHARM REV CODE 250: Performed by: PHYSICIAN ASSISTANT

## 2022-11-14 PROCEDURE — 99024 POSTOP FOLLOW-UP VISIT: CPT | Mod: ,,, | Performed by: ORTHOPAEDIC SURGERY

## 2022-11-14 PROCEDURE — 21400001 HC TELEMETRY ROOM

## 2022-11-14 PROCEDURE — 94761 N-INVAS EAR/PLS OXIMETRY MLT: CPT

## 2022-11-14 PROCEDURE — 27000221 HC OXYGEN, UP TO 24 HOURS

## 2022-11-14 PROCEDURE — 25000003 PHARM REV CODE 250: Performed by: NURSE PRACTITIONER

## 2022-11-14 PROCEDURE — 25000003 PHARM REV CODE 250: Performed by: FAMILY MEDICINE

## 2022-11-14 PROCEDURE — 97535 SELF CARE MNGMENT TRAINING: CPT

## 2022-11-14 PROCEDURE — 99900035 HC TECH TIME PER 15 MIN (STAT)

## 2022-11-14 PROCEDURE — 97116 GAIT TRAINING THERAPY: CPT | Mod: CQ

## 2022-11-14 RX ORDER — BISACODYL 10 MG
10 SUPPOSITORY, RECTAL RECTAL ONCE
Status: DISCONTINUED | OUTPATIENT
Start: 2022-11-14 | End: 2022-11-14

## 2022-11-14 RX ADMIN — MUPIROCIN: 20 OINTMENT TOPICAL at 08:11

## 2022-11-14 RX ADMIN — BUSPIRONE HYDROCHLORIDE 10 MG: 10 TABLET ORAL at 09:11

## 2022-11-14 RX ADMIN — ASPIRIN 81 MG CHEWABLE TABLET 81 MG: 81 TABLET CHEWABLE at 08:11

## 2022-11-14 RX ADMIN — HYDROCODONE BITARTRATE AND ACETAMINOPHEN 1 TABLET: 5; 325 TABLET ORAL at 12:11

## 2022-11-14 RX ADMIN — MORPHINE SULFATE 2 MG: 4 INJECTION INTRAVENOUS at 01:11

## 2022-11-14 RX ADMIN — CHLORHEXIDINE GLUCONATE 0.12% ORAL RINSE 10 ML: 1.2 LIQUID ORAL at 09:11

## 2022-11-14 RX ADMIN — Medication 6 MG: at 08:11

## 2022-11-14 RX ADMIN — CHLORHEXIDINE GLUCONATE 0.12% ORAL RINSE 10 ML: 1.2 LIQUID ORAL at 08:11

## 2022-11-14 RX ADMIN — BUSPIRONE HYDROCHLORIDE 10 MG: 10 TABLET ORAL at 04:11

## 2022-11-14 RX ADMIN — BUSPIRONE HYDROCHLORIDE 10 MG: 10 TABLET ORAL at 08:11

## 2022-11-14 RX ADMIN — ASPIRIN 81 MG CHEWABLE TABLET 81 MG: 81 TABLET CHEWABLE at 09:11

## 2022-11-14 RX ADMIN — MIRTAZAPINE 30 MG: 15 TABLET, FILM COATED ORAL at 08:11

## 2022-11-14 RX ADMIN — POLYETHYLENE GLYCOL 3350 17 G: 17 POWDER, FOR SOLUTION ORAL at 09:11

## 2022-11-14 RX ADMIN — ACETAMINOPHEN 650 MG: 325 TABLET ORAL at 08:11

## 2022-11-14 RX ADMIN — MUPIROCIN: 20 OINTMENT TOPICAL at 09:11

## 2022-11-14 NOTE — ASSESSMENT & PLAN NOTE
Left intertrochanteric fracture on xray  Ortho notified  Plans for surgery in am  Npo  Pain control   11/10:  --surgery today  --prn analgesia  --PT/OT after surgery  --ortho following    PT rec IPR  Continue supportive management  Consult justice management for placement

## 2022-11-14 NOTE — SUBJECTIVE & OBJECTIVE
Interval History: NAEON. Doing well, pending placement.    Review of Systems   Constitutional:  Negative for fever.   Respiratory:  Negative for shortness of breath.    Cardiovascular:  Negative for chest pain.   Gastrointestinal:  Negative for abdominal pain, nausea and vomiting.   Musculoskeletal:  Positive for arthralgias and gait problem.   Psychiatric/Behavioral:  Negative for behavioral problems.    Objective:     Vital Signs (Most Recent):  Temp: 98.8 °F (37.1 °C) (11/13/22 1950)  Pulse: 80 (11/13/22 2122)  Resp: 18 (11/13/22 2122)  BP: 126/60 (11/13/22 1950)  SpO2: 96 % (11/13/22 2122)   Vital Signs (24h Range):  Temp:  [97.8 °F (36.6 °C)-99 °F (37.2 °C)] 98.8 °F (37.1 °C)  Pulse:  [] 80  Resp:  [16-18] 18  SpO2:  [94 %-100 %] 96 %  BP: (126-171)/(60-77) 126/60     Weight: 50.5 kg (111 lb 5.3 oz)  Body mass index is 17.44 kg/m².    Intake/Output Summary (Last 24 hours) at 11/13/2022 2355  Last data filed at 11/13/2022 1207  Gross per 24 hour   Intake --   Output 1025 ml   Net -1025 ml        Physical Exam  Constitutional:       Appearance: She is well-developed.   HENT:      Head: Normocephalic and atraumatic.   Eyes:      Conjunctiva/sclera: Conjunctivae normal.      Pupils: Pupils are equal, round, and reactive to light.   Neck:      Vascular: No JVD.   Cardiovascular:      Rate and Rhythm: Normal rate and regular rhythm.      Heart sounds: Normal heart sounds.   Pulmonary:      Effort: Pulmonary effort is normal. No respiratory distress.      Breath sounds: No wheezing.   Abdominal:      General: Bowel sounds are normal. There is no distension.      Palpations: Abdomen is soft.      Tenderness: There is no abdominal tenderness. There is no guarding.   Musculoskeletal:         General: No tenderness. Normal range of motion.      Cervical back: Normal range of motion.   Skin:     General: Skin is warm and dry.      Capillary Refill: Capillary refill takes less than 2 seconds.   Neurological:       Mental Status: She is alert and oriented to person, place, and time.   Psychiatric:         Behavior: Behavior normal.       Significant Labs: All pertinent labs within the past 24 hours have been reviewed.  CBC:   No results for input(s): WBC, HGB, HCT, PLT in the last 48 hours.    CMP:   No results for input(s): NA, K, CL, CO2, GLU, BUN, CREATININE, CALCIUM, PROT, ALBUMIN, BILITOT, ALKPHOS, AST, ALT, ANIONGAP, EGFRNONAA in the last 48 hours.    Invalid input(s): ESTGFAFRICA    Urine Studies:   No results for input(s): COLORU, APPEARANCEUA, PHUR, SPECGRAV, PROTEINUA, GLUCUA, KETONESU, BILIRUBINUA, OCCULTUA, NITRITE, UROBILINOGEN, LEUKOCYTESUR, RBCUA, WBCUA, BACTERIA, SQUAMEPITHEL, HYALINECASTS in the last 48 hours.    Invalid input(s): TUSHAR      Significant Imaging: I have reviewed all pertinent imaging results/findings within the past 24 hours.

## 2022-11-14 NOTE — SUBJECTIVE & OBJECTIVE
Interval History: NAEON. Doing well, pending placement - referrals sent per son's request for rehab and SNF. Patient had a bowel movement today.     Review of Systems   Constitutional:  Negative for fever.   Respiratory:  Negative for shortness of breath.    Cardiovascular:  Negative for chest pain.   Gastrointestinal:  Negative for abdominal pain, nausea and vomiting.   Musculoskeletal:  Positive for arthralgias and gait problem.   Psychiatric/Behavioral:  Negative for behavioral problems.    Objective:     Vital Signs (Most Recent):  Temp: 98.8 °F (37.1 °C) (11/14/22 1239)  Pulse: 105 (11/14/22 1239)  Resp: 18 (11/14/22 1239)  BP: 135/88 (11/14/22 1239)  SpO2: 96 % (11/14/22 1239)   Vital Signs (24h Range):  Temp:  [97.7 °F (36.5 °C)-98.8 °F (37.1 °C)] 98.8 °F (37.1 °C)  Pulse:  [] 105  Resp:  [16-18] 18  SpO2:  [91 %-100 %] 96 %  BP: (126-176)/(60-88) 135/88     Weight: 51.2 kg (112 lb 14 oz)  Body mass index is 17.68 kg/m².    Intake/Output Summary (Last 24 hours) at 11/14/2022 1411  Last data filed at 11/14/2022 0922  Gross per 24 hour   Intake 120 ml   Output 625 ml   Net -505 ml        Physical Exam  Constitutional:       Appearance: She is well-developed.   HENT:      Head: Normocephalic and atraumatic.   Eyes:      Conjunctiva/sclera: Conjunctivae normal.      Pupils: Pupils are equal, round, and reactive to light.   Pulmonary:      Effort: Pulmonary effort is normal. No respiratory distress.   Musculoskeletal:         General: No tenderness. Normal range of motion.   Skin:     General: Skin is warm and dry.   Neurological:      Mental Status: She is alert and oriented to person, place, and time.   Psychiatric:         Behavior: Behavior normal.       Significant Labs: All pertinent labs within the past 24 hours have been reviewed.  CBC:   No results for input(s): WBC, HGB, HCT, PLT in the last 48 hours.    CMP:   No results for input(s): NA, K, CL, CO2, GLU, BUN, CREATININE, CALCIUM, PROT, ALBUMIN,  BILITOT, ALKPHOS, AST, ALT, ANIONGAP, EGFRNONAA in the last 48 hours.    Invalid input(s): ESTGFAFR[place]    Urine Studies:   No results for input(s): COLORU, APPEARANCEUA, PHUR, SPECGRAV, PROTEINUA, GLUCUA, KETONESU, BILIRUBINUA, OCCULTUA, NITRITE, UROBILINOGEN, LEUKOCYTESUR, RBCUA, WBCUA, BACTERIA, SQUAMEPITHEL, HYALINECASTS in the last 48 hours.    Invalid input(s): TUSHAR      Significant Imaging: I have reviewed all pertinent imaging results/findings within the past 24 hours.

## 2022-11-14 NOTE — PT/OT/SLP PROGRESS
Occupational Therapy   Treatment    Name: Ayde Felix  MRN: [id number]  Admitting Diagnosis:  Closed nondisplaced intertrochanteric fracture of left femur  4 Days Post-Op    Recommendations:     Discharge Recommendations: rehabilitation facility  Discharge Equipment Recommendations:  other (see comments) (TBD AT NEXT LEVEL OF CARE)  Barriers to discharge:  Decreased caregiver support, Inaccessible home environment    Assessment:     Ayde Felix is a 72 y.o. female with a medical diagnosis of Closed nondisplaced intertrochanteric fracture of left femur.  She presents with generalized weakness and debility. Performance deficits affecting function are weakness, impaired endurance, impaired self care skills, impaired functional mobility, decreased ROM, decreased lower extremity function, impaired cardiopulmonary response to activity.     Rehab Prognosis:  Good; patient would benefit from acute skilled OT services to address these deficits and reach maximum level of function.       Plan:     Patient to be seen 2 x/week to address the above listed problems via self-care/home management, therapeutic activities, therapeutic exercises  Plan of Care Expires: 11/25/22  Plan of Care Reviewed with: patient, son    Subjective     Pain/Comfort:  Pain Rating 1: 0/10    Objective:     Communicated with: Nurse Weiner and [facility] chart review  prior to session.  Patient found supine with telemetry, peripheral IV upon OT entry to room.    General Precautions: Standard, fall   Orthopedic Precautions:LUE weight bearing as tolerated, LLE weight bearing as tolerated   Braces: UE brace  Respiratory Status: Room air     Occupational Performance:     Bed Mobility:    Patient completed Rolling/Turning to Right with minimum assistance  Patient completed Scooting/Bridging with minimum assistance  Patient completed Supine to Sit with minimum assistance   With assistance for trunk with supine to sit     Functional Mobility/Transfers:  Patient  completed Sit <> Stand Transfer with minimum assistance  with  rolling walker   Functional Mobility: Pt completed functional mobility Min A with RW    Activities of Daily Living:  Lower Body Dressing: minimum assistance to don/doff brief while seated on commode. Assistance given for threading L LE; pt able to pull above and under hips with CGA for balance while standing.   Toileting: minimum assistance for transfer; supervision for mara care  Supervision for hand hygiene while standing at sink.     [facility] 6 Click ADL: 20    Treatment & Education:  Pt tolerated tx well. Pt educated on benefits of actively participating in occupational therapy and POC. Pt educated on fall prevention and use of call button for assistance. Pt encouraged to complete B UE therex throughout day to improve endurance and muscle strength. Pt verbalized understanding of all education.      Patient left up in chair with all lines intact, call button in reach, and son present    GOALS:   Multidisciplinary Problems       Occupational Therapy Goals          Problem: Occupational Therapy    Goal Priority Disciplines Outcome Interventions   Occupational Therapy Goal     OT, PT/OT Ongoing, Progressing    Description: Goals to be met by: 11/25/22     Patient will increase functional independence with ADLs by performing:    Toileting from toilet with Stand-by Assistance for hygiene and clothing management.   Toilet transfer to toilet with Stand-by Assistance.  Increased functional strength in B UE grossly by 1/2 MM grade.                       Time Tracking:     OT Date of Treatment: 11/14/22  OT Start Time: 0950  OT Stop Time: 1015  OT Total Time (min): 25 min    Billable Minutes:Self Care/Home Management 25 Mins     OT/MIRTHA: OT          11/14/2022

## 2022-11-14 NOTE — PLAN OF CARE
CM spoke with son, Clyde, over phone per request. Son is requesting placement in [place] at [facility]. CM discussed differences between SNF and IRF as well as CMS guidelines regarding IRF appropriate diagnosis. Son requesting referral be sent to [facility] ([facility]). Referral placed via careport, awaiting review and decision.

## 2022-11-14 NOTE — PT/OT/SLP PROGRESS
Physical Therapy  Treatment    Ayde Felix   MRN: [id number]   Admitting Diagnosis: Closed nondisplaced intertrochanteric fracture of left femur    PT Received On: 11/14/22  PT Start Time: 0950     PT Stop Time: 1015    PT Total Time (min): 25 min       Billable Minutes:  Gait Training 10 and Therapeutic Activity 15    Treatment Type: Treatment  PT/PTA: PTA     PTA Visit Number: 3       General Precautions: Standard, fall  Orthopedic Precautions: LUE weight bearing as tolerated, LLE weight bearing as tolerated   Braces: UE brace (L WRIST BRACE)  Respiratory Status: Room air         Subjective:  Communicated with charge nurse and completed Epic chart review prior to session.  Patient agreed to PT session. Reports increased fatigue today but denies pain.     Pain/Comfort  Pain Rating 1: 0/10  Pain Rating Post-Intervention 1: 0/10    Objective:   Patient found with: telemetry, peripheral IV    Functional Mobility:  Supine > sit EOB: CGA    STS from EOB > RW: Min A (VC for hand placement)    8ft from EOB > toilet w/ RW: Min A    Stand pivot T/F to toilet w/ RW: Min A (VC for safety w/ RW mgmt)    STS from toilet > RW: Min A     30ft x2 trials w/ RW Min A (decreased gait speed due to fatigue)    Stand pivot T/F to chair w/ RW: Min A     Educated patient on importance of increased tolerance to upright position and direct impact on CV endurance and strength. Patient encouraged to sit up in chair for a minimum of 2 consecutive hours per day. Encouraged patient to perform AROM TE to BLE throughout the day within all available planes of motion. Re enforced importance of utilizing call light to meet needs in room and not attempt to get up without staff assistance. Patient verbalized understanding and agreed to comply.       AM-PAC 6 CLICK MOBILITY  How much help from another person does this patient currently need?   1 = Unable, Total/Dependent Assistance  2 = A lot, Maximum/Moderate Assistance  3 = A little, Minimum/Contact  Guard/Supervision  4 = None, Modified [facility]/Independent    Turning over in bed (including adjusting bedclothes, sheets and blankets)?: 4  Sitting down on and standing up from a chair with arms (e.g., wheelchair, bedside commode, etc.): 3  Moving from lying on back to sitting on the side of the bed?: 3  Moving to and from a bed to a chair (including a wheelchair)?: 3  Need to walk in hospital room?: 3  Climbing 3-5 steps with a railing?: 1  Basic Mobility Total Score: 17    AM-PAC Raw Score CMS G-Code Modifier Level of Impairment Assistance   6 % Total / Unable   7 - 9 CM 80 - 100% Maximal Assist   10 - 14 CL 60 - 80% Moderate Assist   15 - 19 CK 40 - 60% Moderate Assist   20 - 22 CJ 20 - 40% Minimal Assist   23 CI 1-20% SBA / CGA   24 CH 0% Independent/ Mod I     Patient left up in chair with all lines intact, call button in reach, chair alarm on, and son present.    Assessment:  Ayde Felix is a 72 y.o. female with a medical diagnosis of Closed nondisplaced intertrochanteric fracture of left femur and presents with overall decline in functional mobility. Patient would continue to benefit from skilled PT to address functional limitations listed below in order to return to PLOF/decrease caregiver burden.     Rehab identified problem list/impairments: Rehab identified problem list/impairments: weakness, impaired endurance, impaired self care skills, decreased ROM, decreased lower extremity function, impaired skin, impaired cardiopulmonary response to activity    Rehab potential is good.    Activity tolerance: Fair    Discharge recommendations: Discharge Facility/Level of Care Needs: rehabilitation facility     Barriers to discharge:      Equipment recommendations: Equipment Needed After Discharge: other (see comments) (TBD BY NEXT LEVEL OF CARE)     GOALS:   Multidisciplinary Problems       Physical Therapy Goals          Problem: Physical Therapy    Goal Priority Disciplines Outcome Goal Variances  Interventions   Physical Therapy Goal     PT, PT/OT      Description: LTG'S TO BE MET IN 14 DAYS (11-25-22)  PT WILL REQUIRE CGA FOR BED MOBILITY  PT WILL REQUIRE DERIAN FOR BED<>CHAIR TF'S  PT WILL  FEET WITH RW AND DERIAN                         PLAN:    Patient to be seen 3 x/week  to address the above listed problems via gait training, therapeutic activities, therapeutic exercises  Plan of Care expires: 11/25/22  Plan of Care reviewed with: patient, son         11/14/2022

## 2022-11-14 NOTE — PROGRESS NOTES
Ayde Felix is a 72 y.o. female patient.     Subjective    The patient is 4th postop day from repair of left intertrochanteric femur fracture.  Her son is at the bedside.  She says she is hurting some this morning in the thigh and points to the anterior mid and distal thigh areas.  Has been improving satisfactorily with physical therapy.    1. Closed displaced intertrochanteric fracture of left femur with routine healing, subsequent encounter    2. Fall    3. Pre-op testing    4. Pre-op evaluation    5. Closed fracture of left hip, initial encounter    6. Closed fracture of distal end of left radius, unspecified fracture morphology, initial encounter    7. Closed nondisplaced intertrochanteric fracture of left femur, initial encounter    8. Chest pain    9. Closed nondisplaced intertrochanteric fracture of left femur with routine healing, subsequent encounter      History reviewed. No pertinent past medical history.  No past surgical history pertinent negatives on file.  Scheduled Meds:   aspirin  81 mg Oral BID    busPIRone  10 mg Oral TID    chlorhexidine  10 mL Mouth/Throat BID    mirtazapine  30 mg Oral QHS    mupirocin   Nasal BID    polyethylene glycol  17 g Oral BID     Continuous Infusions:   sodium chloride 0.9% 75 mL/hr at 11/13/22 1214     PRN Meds:acetaminophen, aluminum-magnesium hydroxide-simethicone, diphenhydrAMINE, guaiFENesin 100 mg/5 ml, HYDROcodone-acetaminophen, magnesium oxide, magnesium oxide, melatonin, morphine, naloxone, ondansetron, potassium bicarbonate, potassium bicarbonate, potassium bicarbonate, potassium, sodium phosphates, potassium, sodium phosphates, potassium, sodium phosphates, promethazine, simethicone, sodium chloride 0.9%    Review of patient's allergies indicates:  No Known Allergies  Active Hospital Problems    Diagnosis  POA    *Closed nondisplaced intertrochanteric fracture of left femur [S72.426A]  Yes    Closed fracture of distal end of left radius [S54.139A]  Yes     "CHARO (generalized anxiety disorder) [F41.1]  Yes      Resolved Hospital Problems   No resolved problems to display.       Objective:  Vital signs (most recent): Blood pressure 134/67, pulse 101, temperature 97.7 °F (36.5 °C), temperature source Axillary, resp. rate 16, height 5' 7" (1.702 m), weight 51.2 kg (112 lb 14 oz), SpO2 96 %, not currently breastfeeding.    Well-developed thin female in no acute distress.  She is awake, alert, oriented, cooperative with evaluation.      Examination of the left leg reveals the Aquacel dressing to be intact.  The thigh is minimally swollen in the upper thigh region.  Nontender.  No swelling distally to that.  Good motion of the hip and knee.  No calf tenderness or swelling.     Assessment & Plan       The patient is recovering satisfactorily from repair of left intertrochanteric femur fracture.  Await rehab disposition.  Her son is going to work on that with case management.      Return to the [facility] 2 weeks postop for staple removals.  Aspirin 81 mg b.i.d. for DVT prophylaxis for 30 days.    11/14/2022        Deni Lynn MD, FAAOS [facility], Orthopedic Trauma Service  [place]    "

## 2022-11-14 NOTE — PROGRESS NOTES
[facility] - Telemetry ([facility])  [facility] Medicine  Telemedicine Progress Note    Patient Name: Ayde Felix  MRN: [id number]  Patient Class: IP- Inpatient   Admission Date: 11/9/2022  Length of Stay: 5 days  Attending Physician: Ferny Cifuentes MD  Primary Care Provider: Primary Doctor No          Subjective:     Principal Problem:Closed nondisplaced intertrochanteric fracture of left femur        HPI:  Patient is a 72 y.o. aa female with a PMH of CHARO who presents to the Emergency Department for evaluation of left wrist and LLE pain secondary to fall at 13:00. Patient suffered a fall when she was walking upstairs. Currently complains of left wrist and left hip pain however improving. Denies any other issues.     In the ED, xrays revealed left distal radial fracture and left intertrochanteric fracture. Labs showed an h/h 9.1/29.7 otherwise unremarkable. Ortho consulted who recommended admission to  and plans for surgery in the am.       Overview/Hospital Course:  Patient was admitted for closed nondisplaced intertrochanteric fx of L femur under the care of [facility] medicine. Orthopedic surgery was consulted. 11/10:  Pt scheduled for surgery today at 1pm. Currently NPO - quezada cath and IV pain medication ordered  11/11 POD #2 Patient sitting up in chair at bedside,  reports feeling well, pain controlled. PT rec IPR, case management consulted. Labs reviewed- Hbg trending down, monitor closely, will transfuse for Hbg < 7.       Interval History: NAEON. Doing well, pending placement - referrals sent per son's request for rehab and SNF. Patient had a bowel movement today.     Review of Systems   Constitutional:  Negative for fever.   Respiratory:  Negative for shortness of breath.    Cardiovascular:  Negative for chest pain.   Gastrointestinal:  Negative for abdominal pain, nausea and vomiting.   Musculoskeletal:  Positive for arthralgias and gait problem.   Psychiatric/Behavioral:  Negative for behavioral  problems.    Objective:     Vital Signs (Most Recent):  Temp: 98.8 °F (37.1 °C) (11/14/22 1239)  Pulse: 105 (11/14/22 1239)  Resp: 18 (11/14/22 1239)  BP: 135/88 (11/14/22 1239)  SpO2: 96 % (11/14/22 1239)   Vital Signs (24h Range):  Temp:  [97.7 °F (36.5 °C)-98.8 °F (37.1 °C)] 98.8 °F (37.1 °C)  Pulse:  [] 105  Resp:  [16-18] 18  SpO2:  [91 %-100 %] 96 %  BP: (126-176)/(60-88) 135/88     Weight: 51.2 kg (112 lb 14 oz)  Body mass index is 17.68 kg/m².    Intake/Output Summary (Last 24 hours) at 11/14/2022 1411  Last data filed at 11/14/2022 0922  Gross per 24 hour   Intake 120 ml   Output 625 ml   Net -505 ml        Physical Exam  Constitutional:       Appearance: She is well-developed.   HENT:      Head: Normocephalic and atraumatic.   Eyes:      Conjunctiva/sclera: Conjunctivae normal.      Pupils: Pupils are equal, round, and reactive to light.   Pulmonary:      Effort: Pulmonary effort is normal. No respiratory distress.   Musculoskeletal:         General: No tenderness. Normal range of motion.   Skin:     General: Skin is warm and dry.   Neurological:      Mental Status: She is alert and oriented to person, place, and time.   Psychiatric:         Behavior: Behavior normal.       Significant Labs: All pertinent labs within the past 24 hours have been reviewed.  CBC:   No results for input(s): WBC, HGB, HCT, PLT in the last 48 hours.    CMP:   No results for input(s): NA, K, CL, CO2, GLU, BUN, CREATININE, CALCIUM, PROT, ALBUMIN, BILITOT, ALKPHOS, AST, ALT, ANIONGAP, EGFRNONAA in the last 48 hours.    Invalid input(s): ESTGFAFR[place]    Urine Studies:   No results for input(s): COLORU, APPEARANCEUA, PHUR, SPECGRAV, PROTEINUA, GLUCUA, KETONESU, BILIRUBINUA, OCCULTUA, NITRITE, UROBILINOGEN, LEUKOCYTESUR, RBCUA, WBCUA, BACTERIA, SQUAMEPITHEL, HYALINECASTS in the last 48 hours.    Invalid input(s): TUSHAR      Significant Imaging: I have reviewed all pertinent imaging results/findings within the past 24  hours.      Assessment/Plan:      * Closed nondisplaced intertrochanteric fracture of left femur  Left intertrochanteric fracture on xray  Ortho notified  Plans for surgery in am  Npo  Pain control   11/10:  --surgery today  --prn analgesia  --PT/OT after surgery  --ortho following    PT rec IPR  Continue supportive management  Consult justice management for placement     CHARO (generalized anxiety disorder)  Resume home buspar      Closed fracture of distal end of left radius  Currently in splint  Pain controlled        VTE Risk Mitigation (From admission, onward)         Ordered     Reason for No Pharmacological VTE Prophylaxis  Once        Question:  Reasons:  Answer:  Physician Provided (leave comment)    11/10/22 1208     Place sequential compression device  Until discontinued         11/10/22 8777                      I have completed this tele-visit with the assistance of a telepresenter.    The attending portion of this evaluation, treatment, and documentation was performed per Ferny Cifuentes MD via Telemedicine AudioVisual using the secure [employer] software platform with 2 way audio/video. The provider was located off-site and the patient is located in the hospital. The aforementioned video software was utilized to document the relevant history and physical exam    Ferny Cifuentes MD  Department of Hospital Medicine   [facility] - Telemetry ([facility])

## 2022-11-14 NOTE — PLAN OF CARE
[facility] in [place] clinically accepting. Carmen Snider, \[facility]\"" facility will reach out to family and submit for insurance auth. Voicemail left with son, Clyde.

## 2022-11-14 NOTE — PLAN OF CARE
ROLANDO. Patient rested overnight. PRN pain medication given. Continue current plan of care.  Problem: Adult Inpatient Plan of Care  Goal: Plan of Care Review  Outcome: Ongoing, Progressing  Goal: Patient-Specific Goal (Individualized)  Outcome: Ongoing, Progressing  Goal: Absence of Hospital-Acquired Illness or Injury  Outcome: Ongoing, Progressing  Goal: Optimal Comfort and Wellbeing  Outcome: Ongoing, Progressing  Goal: Readiness for Transition of Care  Outcome: Ongoing, Progressing     Problem: Infection  Goal: Absence of Infection Signs and Symptoms  Outcome: Ongoing, Progressing     Problem: Skin Injury Risk Increased  Goal: Skin Health and Integrity  Outcome: Ongoing, Progressing     Problem: Fall Injury Risk  Goal: Absence of Fall and Fall-Related Injury  Outcome: Ongoing, Progressing

## 2022-11-14 NOTE — PROGRESS NOTES
[facility] - Telemetry ([facility] Medicine  Telemedicine Progress Note    Patient Name: Ayde Felix  MRN: [id number]  Patient Class: IP- Inpatient   Admission Date: 11/9/2022  Length of Stay: 4 days  Attending Physician: Ferny Cifuentes MD  Primary Care Provider: Primary Doctor No          Subjective:     Principal Problem:Closed nondisplaced intertrochanteric fracture of left femur        HPI:  Patient is a 72 y.o. aa female with a PMH of CHARO who presents to the Emergency Department for evaluation of left wrist and LLE pain secondary to fall at 13:00. Patient suffered a fall when she was walking upstairs. Currently complains of left wrist and left hip pain however improving. Denies any other issues.     In the ED, xrays revealed left distal radial fracture and left intertrochanteric fracture. Labs showed an h/h 9.1/29.7 otherwise unremarkable. Ortho consulted who recommended admission to  and plans for surgery in the am.       Overview/Hospital Course:  Patient was admitted for closed nondisplaced intertrochanteric fx of L femur under the care of [facility] medicine. Orthopedic surgery was consulted. 11/10:  Pt scheduled for surgery today at 1pm. Currently NPO - quezada cath and IV pain medication ordered  11/11 POD #2 Patient sitting up in chair at bedside,  reports feeling well, pain controlled. PT rec IPR, case management consulted. Labs reviewed- Hbg trending down, monitor closely, will transfuse for Hbg < 7.       Interval History: NAEON. Doing well, pending placement.    Review of Systems   Constitutional:  Negative for fever.   Respiratory:  Negative for shortness of breath.    Cardiovascular:  Negative for chest pain.   Gastrointestinal:  Negative for abdominal pain, nausea and vomiting.   Musculoskeletal:  Positive for arthralgias and gait problem.   Psychiatric/Behavioral:  Negative for behavioral problems.    Objective:     Vital Signs (Most Recent):  Temp: 98.8 °F (37.1 °C) (11/13/22  1950)  Pulse: 80 (11/13/22 2122)  Resp: 18 (11/13/22 2122)  BP: 126/60 (11/13/22 1950)  SpO2: 96 % (11/13/22 2122)   Vital Signs (24h Range):  Temp:  [97.8 °F (36.6 °C)-99 °F (37.2 °C)] 98.8 °F (37.1 °C)  Pulse:  [] 80  Resp:  [16-18] 18  SpO2:  [94 %-100 %] 96 %  BP: (126-171)/(60-77) 126/60     Weight: 50.5 kg (111 lb 5.3 oz)  Body mass index is 17.44 kg/m².    Intake/Output Summary (Last 24 hours) at 11/13/2022 2355  Last data filed at 11/13/2022 1207  Gross per 24 hour   Intake --   Output 1025 ml   Net -1025 ml        Physical Exam  Constitutional:       Appearance: She is well-developed.   HENT:      Head: Normocephalic and atraumatic.   Eyes:      Conjunctiva/sclera: Conjunctivae normal.      Pupils: Pupils are equal, round, and reactive to light.   Neck:      Vascular: No JVD.   Cardiovascular:      Rate and Rhythm: Normal rate and regular rhythm.      Heart sounds: Normal heart sounds.   Pulmonary:      Effort: Pulmonary effort is normal. No respiratory distress.      Breath sounds: No wheezing.   Abdominal:      General: Bowel sounds are normal. There is no distension.      Palpations: Abdomen is soft.      Tenderness: There is no abdominal tenderness. There is no guarding.   Musculoskeletal:         General: No tenderness. Normal range of motion.      Cervical back: Normal range of motion.   Skin:     General: Skin is warm and dry.      Capillary Refill: Capillary refill takes less than 2 seconds.   Neurological:      Mental Status: She is alert and oriented to person, place, and time.   Psychiatric:         Behavior: Behavior normal.       Significant Labs: All pertinent labs within the past 24 hours have been reviewed.  CBC:   No results for input(s): WBC, HGB, HCT, PLT in the last 48 hours.    CMP:   No results for input(s): NA, K, CL, CO2, GLU, BUN, CREATININE, CALCIUM, PROT, ALBUMIN, BILITOT, ALKPHOS, AST, ALT, ANIONGAP, EGFRNONAA in the last 48 hours.    Invalid input(s): ESTGFAFR[place]    Urine  Studies:   No results for input(s): COLORU, APPEARANCEUA, PHUR, SPECGRAV, PROTEINUA, GLUCUA, KETONESU, BILIRUBINUA, OCCULTUA, NITRITE, UROBILINOGEN, LEUKOCYTESUR, RBCUA, WBCUA, BACTERIA, SQUAMEPITHEL, HYALINECASTS in the last 48 hours.    Invalid input(s): TUSHAR      Significant Imaging: I have reviewed all pertinent imaging results/findings within the past 24 hours.      Assessment/Plan:      * Closed nondisplaced intertrochanteric fracture of left femur  Left intertrochanteric fracture on xray  Ortho notified  Plans for surgery in am  Npo  Pain control   11/10:  --surgery today  --prn analgesia  --PT/OT after surgery  --ortho following  11/11   PT rec IPR  Continue supportive management  Consult justice management for placement     CHARO (generalized anxiety disorder)  Resume home buspar      Closed fracture of distal end of left radius  Currently in splint  Pain controlled        VTE Risk Mitigation (From admission, onward)         Ordered     Reason for No Pharmacological VTE Prophylaxis  Once        Question:  Reasons:  Answer:  Physician Provided (leave comment)    11/10/22 1208     Place sequential compression device  Until discontinued         11/10/22 2775                      I have completed this tele-visit with the assistance of a telepresenter.    The attending portion of this evaluation, treatment, and documentation was performed per Ferny Cifuentes MD via Telemedicine AudioVisual using the secure [employer] software platform with 2 way audio/video. The provider was located off-site and the patient is located in the hospital. The aforementioned video software was utilized to document the relevant history and physical exam    Ferny Cifuentes MD  Department of Hospital Medicine   '[facility] ([facility])

## 2022-11-15 ENCOUNTER — TELEPHONE (OUTPATIENT)
Dept: ORTHOPEDICS | Facility: CLINIC | Age: 72
End: 2022-11-15
Payer: MEDICARE

## 2022-11-15 DIAGNOSIS — M89.8X5 PAIN OF LEFT FEMUR: Primary | ICD-10-CM

## 2022-11-15 PROBLEM — S72.142A CLOSED DISPLACED INTERTROCHANTERIC FRACTURE OF LEFT FEMUR: Status: ACTIVE | Noted: 2022-11-10

## 2022-11-15 PROCEDURE — 97530 THERAPEUTIC ACTIVITIES: CPT

## 2022-11-15 PROCEDURE — 27000221 HC OXYGEN, UP TO 24 HOURS

## 2022-11-15 PROCEDURE — 94761 N-INVAS EAR/PLS OXIMETRY MLT: CPT

## 2022-11-15 PROCEDURE — 21400001 HC TELEMETRY ROOM

## 2022-11-15 PROCEDURE — 97110 THERAPEUTIC EXERCISES: CPT

## 2022-11-15 PROCEDURE — 97116 GAIT TRAINING THERAPY: CPT

## 2022-11-15 PROCEDURE — 25000003 PHARM REV CODE 250: Performed by: PHYSICIAN ASSISTANT

## 2022-11-15 PROCEDURE — 25000003 PHARM REV CODE 250: Performed by: FAMILY MEDICINE

## 2022-11-15 RX ADMIN — CHLORHEXIDINE GLUCONATE 0.12% ORAL RINSE 10 ML: 1.2 LIQUID ORAL at 09:11

## 2022-11-15 RX ADMIN — BUSPIRONE HYDROCHLORIDE 10 MG: 10 TABLET ORAL at 04:11

## 2022-11-15 RX ADMIN — BUSPIRONE HYDROCHLORIDE 10 MG: 10 TABLET ORAL at 09:11

## 2022-11-15 RX ADMIN — HYDROCODONE BITARTRATE AND ACETAMINOPHEN 1 TABLET: 5; 325 TABLET ORAL at 05:11

## 2022-11-15 RX ADMIN — HYDROCODONE BITARTRATE AND ACETAMINOPHEN 1 TABLET: 5; 325 TABLET ORAL at 09:11

## 2022-11-15 RX ADMIN — ACETAMINOPHEN 650 MG: 325 TABLET ORAL at 09:11

## 2022-11-15 RX ADMIN — MUPIROCIN: 20 OINTMENT TOPICAL at 09:11

## 2022-11-15 RX ADMIN — ASPIRIN 81 MG CHEWABLE TABLET 81 MG: 81 TABLET CHEWABLE at 09:11

## 2022-11-15 RX ADMIN — MIRTAZAPINE 30 MG: 15 TABLET, FILM COATED ORAL at 09:11

## 2022-11-15 NOTE — PLAN OF CARE
ROLANDO. Surgical dressing intact to left hip. Patient denied any significant pain. PRN tylenol given. Encouraged patient to continue to work with PT/OT. Continue current plan of care.  Problem: Adult Inpatient Plan of Care  Goal: Plan of Care Review  Outcome: Ongoing, Progressing  Goal: Patient-Specific Goal (Individualized)  Outcome: Ongoing, Progressing  Goal: Absence of Hospital-Acquired Illness or Injury  Outcome: Ongoing, Progressing  Goal: Optimal Comfort and Wellbeing  Outcome: Ongoing, Progressing  Goal: Readiness for Transition of Care  Outcome: Ongoing, Progressing     Problem: Infection  Goal: Absence of Infection Signs and Symptoms  Outcome: Ongoing, Progressing     Problem: Skin Injury Risk Increased  Goal: Skin Health and Integrity  Outcome: Ongoing, Progressing     Problem: Fall Injury Risk  Goal: Absence of Fall and Fall-Related Injury  Outcome: Ongoing, Progressing

## 2022-11-15 NOTE — PROGRESS NOTES
[facility] - Telemetry ([facility])  [facility] Medicine  Telemedicine Progress Note    Patient Name: Ayde Felix  MRN: [id number]  Patient Class: IP- Inpatient   Admission Date: 11/9/2022  Length of Stay: 6 days  Attending Physician: Ferny Cifuentes MD  Primary Care Provider: Primary Doctor No          Subjective:     Principal Problem:Closed displaced intertrochanteric fracture of left femur        HPI:  Patient is a 72 y.o. aa female with a PMH of CHARO who presents to the Emergency Department for evaluation of left wrist and LLE pain secondary to fall at 13:00. Patient suffered a fall when she was walking upstairs. Currently complains of left wrist and left hip pain however improving. Denies any other issues.     In the ED, xrays revealed left distal radial fracture and left intertrochanteric fracture. Labs showed an h/h 9.1/29.7 otherwise unremarkable. Ortho consulted who recommended admission to  and plans for surgery in the am.       Overview/Hospital Course:  Patient was admitted for closed nondisplaced intertrochanteric fx of L femur under the care of \[facility]\"" medicine. Orthopedic surgery was consulted. 11/10:  Pt scheduled for surgery today at 1pm. Currently NPO - quezada cath and IV pain medication ordered  11/11 POD #2 Patient sitting up in chair at bedside,  reports feeling well, pain controlled. PT rec IPR, case management consulted. Labs reviewed- Hbg trending down, monitor closely, will transfuse for Hbg < 7.       Interval History: NAEON. Doing well, accepted to [facility] however requiring P2P - to be scheduled.     Review of Systems   Constitutional:  Negative for fever.   Respiratory:  Negative for shortness of breath.    Cardiovascular:  Negative for chest pain.   Gastrointestinal:  Negative for abdominal pain, nausea and vomiting.   Musculoskeletal:  Positive for arthralgias and gait problem.   Psychiatric/Behavioral:  Negative for behavioral problems.    Objective:     Vital Signs (Most  Recent):  Temp: 98.2 °F (36.8 °C) (11/15/22 1159)  Pulse: 84 (11/15/22 1300)  Resp: 18 (11/15/22 1300)  BP: (!) 164/76 (11/15/22 1159)  SpO2: 98 % (11/15/22 1300)   Vital Signs (24h Range):  Temp:  [97.9 °F (36.6 °C)-98.9 °F (37.2 °C)] 98.2 °F (36.8 °C)  Pulse:  [] 84  Resp:  [16-20] 18  SpO2:  [92 %-98 %] 98 %  BP: (138-177)/(76-83) 164/76     Weight: 51 kg (112 lb 7 oz)  Body mass index is 17.61 kg/m².    Intake/Output Summary (Last 24 hours) at 11/15/2022 1417  Last data filed at 11/15/2022 0842  Gross per 24 hour   Intake 360 ml   Output 1150 ml   Net -790 ml        Physical Exam  Constitutional:       Appearance: She is well-developed.   HENT:      Head: Normocephalic and atraumatic.   Eyes:      Conjunctiva/sclera: Conjunctivae normal.      Pupils: Pupils are equal, round, and reactive to light.   Pulmonary:      Effort: Pulmonary effort is normal. No respiratory distress.   Musculoskeletal:         General: No tenderness. Normal range of motion.   Skin:     General: Skin is warm and dry.   Neurological:      Mental Status: She is alert and oriented to person, place, and time.   Psychiatric:         Behavior: Behavior normal.       Significant Labs: All pertinent labs within the past 24 hours have been reviewed.  CBC:   No results for input(s): WBC, HGB, HCT, PLT in the last 48 hours.    CMP:   No results for input(s): NA, K, CL, CO2, GLU, BUN, CREATININE, CALCIUM, PROT, ALBUMIN, BILITOT, ALKPHOS, AST, ALT, ANIONGAP, EGFRNONAA in the last 48 hours.    Invalid input(s): ESTGFAFRICA    Urine Studies:   No results for input(s): COLORU, APPEARANCEUA, PHUR, SPECGRAV, PROTEINUA, GLUCUA, KETONESU, BILIRUBINUA, OCCULTUA, NITRITE, UROBILINOGEN, LEUKOCYTESUR, RBCUA, WBCUA, BACTERIA, SQUAMEPITHEL, HYALINECASTS in the last 48 hours.    Invalid input(s): TUSHAR      Significant Imaging: I have reviewed all pertinent imaging results/findings within the past 24 hours.      Assessment/Plan:      * Closed displaced  intertrochanteric fracture of left femur  Left intertrochanteric fracture on xray  Ortho notified  Plans for surgery in am  Npo  Pain control   11/10:  --surgery today  --prn analgesia  --PT/OT after surgery  --ortho following    PT rec IPR  Continue supportive management  Consult case management for placement     CHARO (generalized anxiety disorder)  Resume home buspar      Closed fracture of distal end of left radius  Currently in splint  Pain controlled        VTE Risk Mitigation (From admission, onward)         Ordered     Reason for No Pharmacological VTE Prophylaxis  Once        Question:  Reasons:  Answer:  Physician Provided (leave comment)    11/10/22 1208     Place sequential compression device  Until discontinued         11/10/22 0706                      I have completed this tele-visit with the assistance of a telepresenter.    The attending portion of this evaluation, treatment, and documentation was performed per Ferny Cifuentes MD via Telemedicine AudioVisual using the secure [employer] software platform with 2 way audio/video. The provider was located off-site and the patient is located in the hospital. The aforementioned video software was utilized to document the relevant history and physical exam    Ferny Cifuentes MD  Department of Hospital Medicine   [facility] - Telemetry ([facility])

## 2022-11-15 NOTE — PT/OT/SLP PROGRESS
Occupational Therapy  Treatment    Ayde Felix   MRN: [id number]   Admitting Diagnosis: Closed displaced intertrochanteric fracture of left femur    OT Date of Treatment: 11/15/22   OT Start Time: 1110  OT Stop Time: 1135  OT Total Time (min): 25 min    Billable Minutes:  Therapeutic Activity 25 MINUTES    OT/MIRTHA: OT          General Precautions: Standard, fall  Orthopedic Precautions: LLE weight bearing as tolerated, LUE weight bearing as tolerated  Braces: UE brace  Respiratory Status: Room air         Subjective:  Communicated with NURSE AND Epic CHART  prior to session.    Pain/Comfort  Pain Rating 1: 0/10  Location - Side 1: Left  Location - Orientation 1: upper  Location 1: leg    Objective:  Patient found with: telemetry, peripheral IV     Functional Mobility:  Transfers:   MIN A/ CGA  SIT<>STAND TRANSFERS HAND HELD ASSIST  Functional Ambulation:   MIN /CGA WITH SIDE STEP R<>L HAND HELD ASSIST  Activities of Daily Living:  UE CGA WITH [facility]  Balance:   Static Sit: GOOD-: Takes MODERATE challenges from all directions but inconsistently  Dynamic Sit: FAIR+: Maintains balance through MINIMAL excursions of active trunk motion  Static Stand: POOR+: Needs MINIMAL assist to maintain  Dynamic stand: POOR+: Needs MIN (minimal ) assist during gait    Therapeutic Activities and Exercises:  Patient educated on role of OT in acute setting and benefits of participation. Educated on techniques to use to increase independence and decrease fall risk with functional transfers. Educated on importance of OOB activity and calling for A to transfer back to bed. Encouraged completion of B UE AROM therex throughout the day to tolerance to increase functional strength and activity tolerance. Patient stated understanding and in agreement with POC.      AM-PAC 6 CLICK ADL   How much help from another person does this patient currently need?   1 = Unable, Total/Dependent Assistance  2 = A lot, Maximum/Moderate  "Assistance  3 = A little, Minimum/Contact Guard/Supervision  4 = None, Modified [place]/Independent    Putting on and taking off regular lower body clothing? : 3  Bathing (including washing, rinsing, drying)?: 3  Toileting, which includes using toilet, bedpan, or urinal? : 3  Putting on and taking off regular upper body clothing?: 3  Taking care of personal grooming such as brushing teeth?: 4  Eating meals?: 4  Daily Activity Total Score: 20     AM-PAC Raw Score CMS "G-Code Modifier Level of Impairment Assistance   6 % Total / Unable   7 - 8 CM 80 - 100% Maximal Assist   9-13 CL 60 - 80% Moderate Assist   14 - 19 CK 40 - 60% Moderate Assist   20 - 22 CJ 20 - 40% Minimal Assist   23 CI 1-20% SBA / CGA   24 CH 0% Independent/ Mod I       Patient left up in chair with all lines intact, call button in reach, chair alarm on, and NURSE notified    ASSESSMENT:  Ayde Felix is a 72 y.o. female with a medical diagnosis of Closed displaced intertrochanteric fracture of left femur and presents with DEBILITY AND GENERALIZED WEAKNESS.    Rehab identified problem list/impairments: Rehab identified problem list/impairments: weakness, gait instability, decreased upper extremity function, decreased ROM, impaired endurance, impaired balance, decreased lower extremity function, impaired self care skills, impaired functional mobility, decreased coordination, decreased safety awareness    Rehab potential is good.    Activity tolerance: Good    Discharge recommendations: Discharge Facility/Level of Care Needs: rehabilitation facility     Barriers to discharge:      Equipment recommendations:       GOALS:   Multidisciplinary Problems       Occupational Therapy Goals          Problem: Occupational Therapy    Goal Priority Disciplines Outcome Interventions   Occupational Therapy Goal     OT, PT/OT Ongoing, Progressing    Description: Goals to be met by: 11/25/22     Patient will increase functional independence with ADLs by " performing:    Toileting from toilet with Stand-by Assistance for hygiene and clothing management.   Toilet transfer to toilet with Stand-by Assistance.  Increased functional strength in B UE grossly by 1/2 MM grade.                       Plan:  Patient to be seen 2 x/week to address the above listed problems via self-care/home management, therapeutic activities, therapeutic exercises  Plan of Care expires: 11/25/22  Plan of Care reviewed with: patient         11/15/2022

## 2022-11-15 NOTE — TELEPHONE ENCOUNTER
Spoke with patient and scheduled her postop appointments. Appointments scheduled prior to hospital discharge. Patient verbalized understanding of appointment dates, time and location.

## 2022-11-15 NOTE — PT/OT/SLP PROGRESS
Physical Therapy Treatment    Patient Name:  Ayde Felix   MRN:  [id number]    Recommendations:     Discharge Recommendations:  rehabilitation facility   Discharge Equipment Recommendations: walker, rolling (TBD at next level of care)   Barriers to discharge: Inaccessible home and Decreased caregiver support    Assessment:     Ayde Felix is a 72 y.o. female admitted with a medical diagnosis of Closed displaced intertrochanteric fracture of left femur.  She presents with the following impairments/functional limitations:  gait instability, decreased lower extremity function, impaired balance, impaired endurance, weakness, decreased safety awareness, pain, orthopedic precautions, impaired self care skills.     Rehab Prognosis: Good; patient would benefit from acute skilled PT services to address these deficits and reach maximum level of function.    Recent Surgery: Procedure(s) (LRB):  ORIF, FRACTURE, FEMUR (Left) 5 Days Post-Op    Plan:     During this hospitalization, patient to be seen 3 x/week to address the identified rehab impairments via gait training, therapeutic exercises and progress toward the following goals:    Plan of Care Expires:  11/25/22    Subjective     Chief Complaint: none stated  Patient/Family Comments/goals: want to return to PLOF  Pain/Comfort:  Pain Rating 1: 4/10  Location - Side 1: Left  Location 1: leg  Pain Addressed 1:  (acknowledged)  Pain Rating 2: 0/10      Objective:     Communicated with nurse Telles and epi chart review prior to session.  Patient found  toileting with nurse  with telemetry, peripheral IV upon PT entry to room.     General Precautions: Standard, fall   Orthopedic Precautions:LUE weight bearing as tolerated, LLE weight bearing as tolerated   Braces: UE brace  Respiratory Status: Room air     Functional Mobility:  Bed Mobility:   verbal cues for technique and direction  Scooting up in bed: maximal assistance and of 2 persons  Bridging: moderate assistance  Supine  "to Sit: minimum assistance  Sit to Supine: minimum assistance  Transfers:     Sit to Stand:  contact guard assistance with rolling walker  Bed to Chair: contact guard assistance with  rolling walker  using  Step Transfer  Toilet Transfer: contact guard assistance with  rolling walker  using  Step Transfer  Gait: 140' with RW with CGA  Verbal cues to emphasize heel strike at IC.       AM-PAC 6 CLICK MOBILITY  Turning over in bed (including adjusting bedclothes, sheets and blankets)?: 3  Sitting down on and standing up from a chair with arms (e.g., wheelchair, bedside commode, etc.): 3  Moving from lying on back to sitting on the side of the bed?: 3  Moving to and from a bed to a chair (including a wheelchair)?: 3  Need to walk in hospital room?: 3  Climbing 3-5 steps with a railing?: 1  Basic Mobility Total Score: 16       Treatment & Education:  Gait belt and  socks donned prior to session.   Verbal cues requried for RW safety and to remain within RW to maintain upright posture  Pt completed 5x Sit to stands with glute contraction in standing, and 10x MIP, TKE, hip abd/add sets and AP seated in chair.   Pt educated on the following: Pt verbally agreed in understanding.   Log roll and bridging to scoot for bed mobility  Continue therapuetic exercises, ambulate with nurse, and sit in chair throughought the day to increase functional strength and activity tolerance and decrease risk of blood clots and secondary infection.   "Call, don't fall" procedure of pressing red button on call bell for all transfers.       Patient left up in chair with all lines intact, call button in reach, chair alarm on, and all needs met ..    GOALS:   Multidisciplinary Problems       Physical Therapy Goals          Problem: Physical Therapy    Goal Priority Disciplines Outcome Goal Variances Interventions   Physical Therapy Goal     PT, PT/OT Ongoing, Progressing     Description: LTG'S TO BE MET IN 14 DAYS (11-25-22)  PT WILL REQUIRE CGA " FOR BED MOBILITY  PT WILL REQUIRE DERIAN FOR BED<>CHAIR TF'S  PT WILL  FEET WITH RW AND DERIAN                         Time Tracking:     PT Received On: 11/15/22  PT Start Time: 0910     PT Stop Time: 0935  PT Total Time (min): 25 min     Billable Minutes: Gait Training 12 and Therapeutic Exercise 13    Treatment Type: Treatment  PT/PTA: PT     PTA Visit Number: 0     11/15/2022

## 2022-11-15 NOTE — PROGRESS NOTES
'[facility])  Orthopedics  Progress Note    Patient Name: Ayde Felix  MRN: [id number]  Admission Date: 11/9/2022  Hospital Length of Stay: 6 days  Attending Provider: Ferny Cifuentes MD  Primary Care Provider: Primary Doctor No  Follow-up For: Procedure(s) (LRB):  ORIF, FRACTURE, FEMUR (Left)    Post-Operative Day: 5 Days Post-Op  Subjective:     Principal Problem:Closed nondisplaced intertrochanteric fracture of left femur    Principal Orthopedic Problem:  Closed nondisplaced intertrochanteric fracture of left femur    Interval History: Ayde Felix is a 72-year-old female postop day 5 status post operative fixation of left intertrochanteric femur fracture with an intramedullary nail.  Patient is sitting up at bedside eating breakfast today.  Patient has no new complaints at this time    Review of patient's allergies indicates:  No Known Allergies    Current Facility-Administered Medications   Medication    acetaminophen tablet 650 mg    aluminum-magnesium hydroxide-simethicone 200-200-20 mg/5 mL suspension 30 mL    aspirin chewable tablet 81 mg    busPIRone tablet 10 mg    diphenhydrAMINE capsule 25 mg    guaiFENesin 100 mg/5 ml syrup 200 mg    HYDROcodone-acetaminophen 5-325 mg per tablet 1 tablet    magnesium oxide tablet 800 mg    magnesium oxide tablet 800 mg    melatonin tablet 6 mg    mirtazapine tablet 30 mg    morphine injection 2 mg    naloxone 0.4 mg/mL injection 0.02 mg    ondansetron disintegrating tablet 8 mg    polyethylene glycol packet 17 g    potassium bicarbonate disintegrating tablet 35 mEq    potassium bicarbonate disintegrating tablet 50 mEq    potassium bicarbonate disintegrating tablet 60 mEq    potassium, sodium phosphates 280-160-250 mg packet 2 packet    potassium, sodium phosphates 280-160-250 mg packet 2 packet    potassium, sodium phosphates 280-160-250 mg packet 2 packet    promethazine tablet 25 mg    simethicone chewable tablet 80 mg    sodium chloride 0.9%  "flush 10 mL     Objective:     Vital Signs (Most Recent):  Temp: 97.9 °F (36.6 °C) (11/15/22 0744)  Pulse: 99 (11/15/22 0744)  Resp: 18 (11/15/22 0949)  BP: 139/76 (11/15/22 0744)  SpO2: 98 % (11/15/22 0744) Vital Signs (24h Range):  Temp:  [97.9 °F (36.6 °C)-98.9 °F (37.2 °C)] 97.9 °F (36.6 °C)  Pulse:  [] 99  Resp:  [16-20] 18  SpO2:  [92 %-98 %] 98 %  BP: (135-177)/(76-88) 139/76     Weight: 51 kg (112 lb 7 oz)  Height: 5' 7" (170.2 cm)  Body mass index is 17.61 kg/m².      Intake/Output Summary (Last 24 hours) at 11/15/2022 1008  Last data filed at 11/15/2022 0842  Gross per 24 hour   Intake 560 ml   Output 1150 ml   Net -590 ml       Ortho/SPM Exam  Left lower extremity:  Dressing is clean, dry, and intact  Mild edema throughout the thigh  No pain with internal/external rotation  Hip ROM full  Calf and compartments are soft and compressible  Motor exam normal   Sensation pulses intact    Significant Labs:   Recent Lab Results       None          All pertinent labs within the past 24 hours have been reviewed.    Significant Imaging: I have reviewed and interpreted all pertinent imaging results/findings.    Assessment/Plan:     Active Diagnoses:    Diagnosis Date Noted POA    PRINCIPAL PROBLEM:  Closed nondisplaced intertrochanteric fracture of left femur [S72.145A] 11/10/2022 Yes    Closed fracture of distal end of left radius [S52.502A] 11/10/2022 Yes    CHARO (generalized anxiety disorder) [F41.1] 11/10/2022 Yes      Problems Resolved During this Admission:     Assessment:  72-year-old female postop day 5 status post operative fixation of left intertrochanteric femur fracture with intramedullary nail    Plan:   Weightbearing as tolerated to the left lower extremity   PT/OT for gait training and ADLs   Patient has been accepted at [facility] inpatient rehab in [place], awaiting insurance approval   Patient is ready for discharge from orthopedic standpoint   Aspirin 81 mg b.i.d. for 30 days for DVT " prophylaxis  Patient will need follow-up 2 weeks postop [facility], but I explained that if the inpatient rehab facility would like to remove her staples then we can see her back at 4 weeks postop    Brandon Toro PA-C  Orthopedics  [facility] - Telemetry ([facility])

## 2022-11-15 NOTE — SUBJECTIVE & OBJECTIVE
Interval History: NAEON. Doing well, accepted to [facility] however requiring P2P - to be scheduled.     Review of Systems   Constitutional:  Negative for fever.   Respiratory:  Negative for shortness of breath.    Cardiovascular:  Negative for chest pain.   Gastrointestinal:  Negative for abdominal pain, nausea and vomiting.   Musculoskeletal:  Positive for arthralgias and gait problem.   Psychiatric/Behavioral:  Negative for behavioral problems.    Objective:     Vital Signs (Most Recent):  Temp: 98.2 °F (36.8 °C) (11/15/22 1159)  Pulse: 84 (11/15/22 1300)  Resp: 18 (11/15/22 1300)  BP: (!) 164/76 (11/15/22 1159)  SpO2: 98 % (11/15/22 1300)   Vital Signs (24h Range):  Temp:  [97.9 °F (36.6 °C)-98.9 °F (37.2 °C)] 98.2 °F (36.8 °C)  Pulse:  [] 84  Resp:  [16-20] 18  SpO2:  [92 %-98 %] 98 %  BP: (138-177)/(76-83) 164/76     Weight: 51 kg (112 lb 7 oz)  Body mass index is 17.61 kg/m².    Intake/Output Summary (Last 24 hours) at 11/15/2022 1417  Last data filed at 11/15/2022 0842  Gross per 24 hour   Intake 360 ml   Output 1150 ml   Net -790 ml        Physical Exam  Constitutional:       Appearance: She is well-developed.   HENT:      Head: Normocephalic and atraumatic.   Eyes:      Conjunctiva/sclera: Conjunctivae normal.      Pupils: Pupils are equal, round, and reactive to light.   Pulmonary:      Effort: Pulmonary effort is normal. No respiratory distress.   Musculoskeletal:         General: No tenderness. Normal range of motion.   Skin:     General: Skin is warm and dry.   Neurological:      Mental Status: She is alert and oriented to person, place, and time.   Psychiatric:         Behavior: Behavior normal.       Significant Labs: All pertinent labs within the past 24 hours have been reviewed.  CBC:   No results for input(s): WBC, HGB, HCT, PLT in the last 48 hours.    CMP:   No results for input(s): NA, K, CL, CO2, GLU, BUN, CREATININE, CALCIUM, PROT, ALBUMIN, BILITOT, ALKPHOS, AST, ALT, ANIONGAP,  EGFRNONAA in the last 48 hours.    Invalid input(s): ESTGFAFRICA    Urine Studies:   No results for input(s): COLORU, APPEARANCEUA, PHUR, SPECGRAV, PROTEINUA, GLUCUA, KETONESU, BILIRUBINUA, OCCULTUA, NITRITE, UROBILINOGEN, LEUKOCYTESUR, RBCUA, WBCUA, BACTERIA, SQUAMEPITHEL, HYALINECASTS in the last 48 hours.    Invalid input(s): TUSHAR      Significant Imaging: I have reviewed all pertinent imaging results/findings within the past 24 hours.

## 2022-11-15 NOTE — ASSESSMENT & PLAN NOTE
Left intertrochanteric fracture on xray  Ortho notified  Plans for surgery in am  Npo  Pain control   11/10:  --surgery today  --prn analgesia  --PT/OT after surgery  --ortho following    PT rec [facility]  Continue supportive management  Consult case management for placement

## 2022-11-15 NOTE — PLAN OF CARE
Pt Min-Max A with bed mobility and CGA with transfers and GT. Pt ' with RW.   PT rec rehab at D/C.

## 2022-11-16 PROCEDURE — 21400001 HC TELEMETRY ROOM

## 2022-11-16 PROCEDURE — 97110 THERAPEUTIC EXERCISES: CPT

## 2022-11-16 PROCEDURE — 94761 N-INVAS EAR/PLS OXIMETRY MLT: CPT

## 2022-11-16 PROCEDURE — 25000003 PHARM REV CODE 250: Performed by: PHYSICIAN ASSISTANT

## 2022-11-16 PROCEDURE — 25000003 PHARM REV CODE 250: Performed by: FAMILY MEDICINE

## 2022-11-16 PROCEDURE — 97116 GAIT TRAINING THERAPY: CPT

## 2022-11-16 RX ADMIN — BUSPIRONE HYDROCHLORIDE 10 MG: 10 TABLET ORAL at 03:11

## 2022-11-16 RX ADMIN — ASPIRIN 81 MG CHEWABLE TABLET 81 MG: 81 TABLET CHEWABLE at 08:11

## 2022-11-16 RX ADMIN — MIRTAZAPINE 30 MG: 15 TABLET, FILM COATED ORAL at 08:11

## 2022-11-16 RX ADMIN — HYDROCODONE BITARTRATE AND ACETAMINOPHEN 1 TABLET: 5; 325 TABLET ORAL at 08:11

## 2022-11-16 RX ADMIN — HYDROCODONE BITARTRATE AND ACETAMINOPHEN 1 TABLET: 5; 325 TABLET ORAL at 07:11

## 2022-11-16 RX ADMIN — ACETAMINOPHEN 650 MG: 325 TABLET ORAL at 03:11

## 2022-11-16 RX ADMIN — BUSPIRONE HYDROCHLORIDE 10 MG: 10 TABLET ORAL at 08:11

## 2022-11-16 NOTE — PT/OT/SLP PROGRESS
Physical Therapy Treatment    Patient Name:  Ayde Felix   MRN:  [id number]    Recommendations:     Discharge Recommendations:  rehabilitation facility   Discharge Equipment Recommendations: walker, rolling (TBD at next level of care)   Barriers to discharge: Inaccessible home and Decreased caregiver support    Assessment:     Ayde Felix is a 72 y.o. female admitted with a medical diagnosis of Closed displaced intertrochanteric fracture of left femur.  She presents with the following impairments/functional limitations:  weakness, gait instability, impaired balance, impaired endurance, decreased safety awareness, impaired cardiopulmonary response to activity, orthopedic precautions, impaired self care skills, impaired functional mobility. Pt improved gait distance by 20' but required 2 seated rest breaks secondary to pain in LLE.     Rehab Prognosis: Good; patient would benefit from acute skilled PT services to address these deficits and reach maximum level of function.    Recent Surgery: Procedure(s) (LRB):  ORIF, FRACTURE, FEMUR (Left) 6 Days Post-Op    Plan:     During this hospitalization, patient to be seen 3 x/week to address the identified rehab impairments via gait training, therapeutic exercises and progress toward the following goals:    Plan of Care Expires:  11/25/22    Subjective     Chief Complaint: none stated  Patient/Family Comments/goals: to return to PLOF  Pain/Comfort:  Pain Rating 1: 0/10  Pain Rating 2: 0/10  Pt reported pain during GT in LLE but did not report a correlated numerical value.      Objective:     Communicated with nurse Arnold and [facility] chart review prior to session.  Patient found up in chair with peripheral IV, telemetry upon PT entry to room.     General Precautions: Standard, fall   Orthopedic Precautions:LLE weight bearing as tolerated, LUE weight bearing as tolerated   Braces: UE brace  Respiratory Status: Room air     Functional Mobility:  Transfers:     Sit <> Stand:   "contact guard assistance with rolling walker  Gait: 160' in total with RW with CGA  GT 50' then seated rest break for 1 minute due to pain. GT 30' then seated rest break due to pain. GT 80' followed with seated 1 minute rest break before completing TE.   Verbal cues to emphasize heel strike at IC.   Balance: static standing balance for 2 minutes: Good with limited postural sway, RW and CGA      AM-PAC 6 CLICK MOBILITY  Turning over in bed (including adjusting bedclothes, sheets and blankets)?: 1  Sitting down on and standing up from a chair with arms (e.g., wheelchair, bedside commode, etc.): 4  Moving from lying on back to sitting on the side of the bed?: 1  Moving to and from a bed to a chair (including a wheelchair)?: 3  Need to walk in hospital room?: 3  Climbing 3-5 steps with a railing?: 1  Basic Mobility Total Score: 13       Treatment & Education:  Gait belt and  socks donned prior to session.   Verbal cues requried for RW safety and to remain within RW to maintain upright posture  Pt completed 5x Sit to stands with glute contraction in standing, and 10x MIP and calf raises in standing with RW with CGA. Pt demonstrated limited postural sway. Pt also completed 10x hip abd/add sets seated in chair.   Pt educated on the following: Pt verbally agreed in understanding.   Continue therapuetic exercises, ambulate with nurse, and sit in chair throughought the day to increase functional strength and activity tolerance and decrease risk of blood clots and secondary infection.   "Call, don't fall" procedure of pressing red button on call bell for all transfers.       Patient left up in chair with all lines intact, call button in reach, chair alarm on, and all needs met       GOALS:   Multidisciplinary Problems       Physical Therapy Goals          Problem: Physical Therapy    Goal Priority Disciplines Outcome Goal Variances Interventions   Physical Therapy Goal     PT, PT/OT Ongoing, Progressing     Description: " LTG'S TO BE MET IN 14 DAYS (11-30-22)  PT WILL REQUIRE IND FOR BED MOBILITY  PT WILL REQUIRE MOD I with RW FOR BED<>CHAIR TF'S  PT WILL '  FEET WITH RW AND CGA                         Time Tracking:     PT Received On: 11/16/22  PT Start Time: 0920     PT Stop Time: 0943  PT Total Time (min): 23 min     Billable Minutes: Gait Training 11 and Therapeutic Exercise 12    Treatment Type: Treatment  PT/PTA: PT     PTA Visit Number: 0     11/16/2022

## 2022-11-16 NOTE — PLAN OF CARE
CM called and updated son regarding d/c planning and IRF denial. Son understanding and requesting referrals to [facility] SNF facility. CM reviewed [employer] in-network SNF list over phone, informed list is limited. Son requesting facilities no further than [facility].    Referrals sent to:  [facility]  [facility] - denied, no beds  [facility]

## 2022-11-16 NOTE — PLAN OF CARE
ROLANDO. Pain well controlled. Patient anticipating discharge soon. Continue current plan of care.  Problem: Adult Inpatient Plan of Care  Goal: Plan of Care Review  Outcome: Ongoing, Progressing  Goal: Patient-Specific Goal (Individualized)  Outcome: Ongoing, Progressing  Goal: Absence of Hospital-Acquired Illness or Injury  Outcome: Ongoing, Progressing  Goal: Optimal Comfort and Wellbeing  Outcome: Ongoing, Progressing  Goal: Readiness for Transition of Care  Outcome: Ongoing, Progressing     Problem: Infection  Goal: Absence of Infection Signs and Symptoms  Outcome: Ongoing, Progressing     Problem: Skin Injury Risk Increased  Goal: Skin Health and Integrity  Outcome: Ongoing, Progressing     Problem: Fall Injury Risk  Goal: Absence of Fall and Fall-Related Injury  Outcome: Ongoing, Progressing

## 2022-11-16 NOTE — PROGRESS NOTES
'[facility])  Orthopedics  Progress Note    Patient Name: Ayde Felix  MRN: [id number]  Admission Date: 11/9/2022  Hospital Length of Stay: 7 days  Attending Provider: Ferny Cifuentes MD  Primary Care Provider: Primary Doctor No  Follow-up For: Procedure(s) (LRB):  ORIF, FRACTURE, FEMUR (Left)    Post-Operative Day: 6 Days Post-Op  Subjective:     Principal Problem:Closed displaced intertrochanteric fracture of left femur    Principal Orthopedic Problem:  Closed nondisplaced intertrochanteric fracture of left femur    Interval History: Ayde Felix is a 72-year-old female postop day 6 status postoperative fixation of left intertrochanteric femur fracture with an intramedullary nail.  Patient is sitting up on the side of the bed today.  She has no new complaints.  She is awaiting rehab approval    Review of patient's allergies indicates:  No Known Allergies    Current Facility-Administered Medications   Medication    acetaminophen tablet 650 mg    aluminum-magnesium hydroxide-simethicone 200-200-20 mg/5 mL suspension 30 mL    aspirin chewable tablet 81 mg    busPIRone tablet 10 mg    diphenhydrAMINE capsule 25 mg    guaiFENesin 100 mg/5 ml syrup 200 mg    HYDROcodone-acetaminophen 5-325 mg per tablet 1 tablet    magnesium oxide tablet 800 mg    magnesium oxide tablet 800 mg    melatonin tablet 6 mg    mirtazapine tablet 30 mg    morphine injection 2 mg    naloxone 0.4 mg/mL injection 0.02 mg    ondansetron disintegrating tablet 8 mg    polyethylene glycol packet 17 g    potassium bicarbonate disintegrating tablet 35 mEq    potassium bicarbonate disintegrating tablet 50 mEq    potassium bicarbonate disintegrating tablet 60 mEq    potassium, sodium phosphates 280-160-250 mg packet 2 packet    potassium, sodium phosphates 280-160-250 mg packet 2 packet    potassium, sodium phosphates 280-160-250 mg packet 2 packet    promethazine tablet 25 mg    simethicone chewable tablet 80 mg    sodium  "chloride 0.9% flush 10 mL     Objective:     Vital Signs (Most Recent):  Temp: 98.2 °F (36.8 °C) (11/16/22 0736)  Pulse: 92 (11/16/22 0736)  Resp: 17 (11/16/22 0737)  BP: (!) 156/72 (11/16/22 0736)  SpO2: (!) 93 % (11/16/22 0417)   Vital Signs (24h Range):  Temp:  [98.2 °F (36.8 °C)-99.2 °F (37.3 °C)] 98.2 °F (36.8 °C)  Pulse:  [] 92  Resp:  [17-19] 17  SpO2:  [92 %-99 %] 93 %  BP: (136-180)/(61-81) 156/72     Weight: 50.6 kg (111 lb 8.8 oz)  Height: 5' 7" (170.2 cm)  Body mass index is 17.47 kg/m².      Intake/Output Summary (Last 24 hours) at 11/16/2022 0818  Last data filed at 11/15/2022 1230  Gross per 24 hour   Intake 440 ml   Output --   Net 440 ml       Ortho/SPM Exam  Left lower extremity:  Dressing is clean, dry, and intact  Mild edema throughout the thigh  No pain with internal/external rotation  Hip ROM full  Calf and compartments are soft and compressible  Motor exam normal   Sensation pulses intact    GEN: Well developed, well nourished female. AAOX3. No acute distress.   Head: Normocephalic, atraumatic.   Eyes: RIP  Neck: Trachea is midline, no adenopathy  Resp: Breathing unlabored.  Neuro: Motor function normal, Cranial nerves intact  Psych: Mood and affect appropriate.      Significant Labs:   Recent Lab Results       None          All pertinent labs within the past 24 hours have been reviewed.    Significant Imaging: I have reviewed and interpreted all pertinent imaging results/findings.    Assessment/Plan:     Active Diagnoses:    Diagnosis Date Noted POA    PRINCIPAL PROBLEM:  Closed displaced intertrochanteric fracture of left femur [S72.142A] 11/10/2022 Yes    Closed fracture of distal end of left radius [S52.502A] 11/10/2022 Yes    CHARO (generalized anxiety disorder) [F41.1] 11/10/2022 Yes      Problems Resolved During this Admission:     Assessment:  72-year-old female postop day 6 status postoperative fixation of left intertrochanteric femur fracture with an intramedullary nail "     Plan:  Weight-bearing as tolerated to the left lower extremity   Dressing change today   PT/OT for gait training and ADLs   Patient has been accepted at [facility] re[facility] [place], but insurance approval is pending.  Appear   Patient is ready for discharge from orthopedic standpoint   Aspirin 81 mg b.i.d. for 30 days for DVT prophylaxis   Patient has follow-up scheduled for 2 weeks postop in the [facility], but she may have the facility removed her staples and then follow-up with us at 4 weeks postop    Brandon Toro PA-C  Orthopedics  [facility] - Telemetry ([facility])    AFVSS. Last HCT 25  J Fredo

## 2022-11-16 NOTE — PLAN OF CARE
Pt CGA with transfers and GT. Pt improved gait distance from 140' to 160' today with RW with 2 rest breaks in between.   PT rec rehab at D/C.

## 2022-11-16 NOTE — PLAN OF CARE
P2P denied, insurance recommending [facility]. CM to contact son, Clyde, this morning to update.

## 2022-11-16 NOTE — PLAN OF CARE
CM spoke with patient and son Clyde (over phone). Informed at this time only accepting facility near [place] is [facility]. Patient/son agreeable to pursue SNF placement at facility. Maria M with facility to call son and then will submit for insurance auth.

## 2022-11-17 VITALS
HEIGHT: 67 IN | TEMPERATURE: 99 F | BODY MASS INDEX: 17.51 KG/M2 | WEIGHT: 111.56 LBS | RESPIRATION RATE: 20 BRPM | SYSTOLIC BLOOD PRESSURE: 138 MMHG | OXYGEN SATURATION: 96 % | DIASTOLIC BLOOD PRESSURE: 73 MMHG | HEART RATE: 93 BPM

## 2022-11-17 PROBLEM — I10 HTN (HYPERTENSION): Status: ACTIVE | Noted: 2022-11-17

## 2022-11-17 LAB
CTP QC/QA: YES
SARS-COV-2 AG RESP QL IA.RAPID: NEGATIVE

## 2022-11-17 PROCEDURE — 97116 GAIT TRAINING THERAPY: CPT

## 2022-11-17 PROCEDURE — 97110 THERAPEUTIC EXERCISES: CPT

## 2022-11-17 PROCEDURE — 97162 PT EVAL MOD COMPLEX 30 MIN: CPT

## 2022-11-17 PROCEDURE — 25000003 PHARM REV CODE 250: Performed by: FAMILY MEDICINE

## 2022-11-17 PROCEDURE — 25000003 PHARM REV CODE 250: Performed by: HOSPITALIST

## 2022-11-17 PROCEDURE — 97530 THERAPEUTIC ACTIVITIES: CPT

## 2022-11-17 PROCEDURE — 25000003 PHARM REV CODE 250: Performed by: PHYSICIAN ASSISTANT

## 2022-11-17 PROCEDURE — 30200315 PPD INTRADERMAL TEST REV CODE 302: Performed by: HOSPITALIST

## 2022-11-17 PROCEDURE — 86580 TB INTRADERMAL TEST: CPT | Performed by: HOSPITALIST

## 2022-11-17 PROCEDURE — 94761 N-INVAS EAR/PLS OXIMETRY MLT: CPT

## 2022-11-17 RX ORDER — POLYETHYLENE GLYCOL 3350 17 G/17G
17 POWDER, FOR SOLUTION ORAL 2 TIMES DAILY
Refills: 0
Start: 2022-11-17 | End: 2023-01-09

## 2022-11-17 RX ORDER — ACETAMINOPHEN 325 MG/1
650 TABLET ORAL EVERY 4 HOURS PRN
Refills: 0
Start: 2022-11-17 | End: 2023-01-09

## 2022-11-17 RX ORDER — HYDROCODONE BITARTRATE AND ACETAMINOPHEN 5; 325 MG/1; MG/1
1 TABLET ORAL EVERY 6 HOURS PRN
Qty: 10 TABLET | Refills: 0
Start: 2022-11-17 | End: 2022-11-17 | Stop reason: SDUPTHER

## 2022-11-17 RX ORDER — SIMETHICONE 80 MG
80 TABLET,CHEWABLE ORAL 4 TIMES DAILY PRN
Refills: 0
Start: 2022-11-17 | End: 2023-01-09

## 2022-11-17 RX ORDER — GUAIFENESIN 100 MG/5ML
200 SOLUTION ORAL EVERY 4 HOURS PRN
Refills: 0
Start: 2022-11-17 | End: 2022-11-27

## 2022-11-17 RX ORDER — AMLODIPINE BESYLATE 5 MG/1
5 TABLET ORAL DAILY
Status: DISCONTINUED | OUTPATIENT
Start: 2022-11-17 | End: 2022-11-18 | Stop reason: HOSPADM

## 2022-11-17 RX ORDER — NAPROXEN SODIUM 220 MG/1
81 TABLET, FILM COATED ORAL 2 TIMES DAILY
Qty: 60 TABLET | Refills: 11
Start: 2022-11-17 | End: 2023-11-17

## 2022-11-17 RX ORDER — AMLODIPINE BESYLATE 5 MG/1
5 TABLET ORAL DAILY
Qty: 30 TABLET | Refills: 11 | Status: SHIPPED | OUTPATIENT
Start: 2022-11-18 | End: 2023-01-09 | Stop reason: SDUPTHER

## 2022-11-17 RX ORDER — HYDROCODONE BITARTRATE AND ACETAMINOPHEN 5; 325 MG/1; MG/1
1 TABLET ORAL EVERY 6 HOURS PRN
Qty: 10 TABLET | Refills: 0 | Status: SHIPPED | OUTPATIENT
Start: 2022-11-17 | End: 2022-12-14 | Stop reason: ALTCHOICE

## 2022-11-17 RX ORDER — HYDRALAZINE HYDROCHLORIDE 25 MG/1
25 TABLET, FILM COATED ORAL EVERY 8 HOURS PRN
Status: DISCONTINUED | OUTPATIENT
Start: 2022-11-17 | End: 2022-11-18 | Stop reason: HOSPADM

## 2022-11-17 RX ADMIN — HYDROCODONE BITARTRATE AND ACETAMINOPHEN 1 TABLET: 5; 325 TABLET ORAL at 08:11

## 2022-11-17 RX ADMIN — AMLODIPINE BESYLATE 5 MG: 5 TABLET ORAL at 01:11

## 2022-11-17 RX ADMIN — ACETAMINOPHEN 650 MG: 325 TABLET ORAL at 04:11

## 2022-11-17 RX ADMIN — BUSPIRONE HYDROCHLORIDE 10 MG: 10 TABLET ORAL at 02:11

## 2022-11-17 RX ADMIN — MIRTAZAPINE 30 MG: 15 TABLET, FILM COATED ORAL at 08:11

## 2022-11-17 RX ADMIN — TUBERCULIN PURIFIED PROTEIN DERIVATIVE 5 UNITS: 5 INJECTION, SOLUTION INTRADERMAL at 03:11

## 2022-11-17 RX ADMIN — ASPIRIN 81 MG CHEWABLE TABLET 81 MG: 81 TABLET CHEWABLE at 08:11

## 2022-11-17 RX ADMIN — BUSPIRONE HYDROCHLORIDE 10 MG: 10 TABLET ORAL at 08:11

## 2022-11-17 NOTE — PLAN OF CARE
[facility] - Telemetry (Hospital)  Discharge Final Note    Primary Care Provider: Primary Doctor No    Expected Discharge Date: 11/17/2022    Final Discharge Note (most recent)       Final Note - 11/17/22 1627          Final Note    Assessment Type Final Discharge Note     Anticipated Discharge Disposition Skilled Nursing Facility        Post-Acute Status    Discharge Delays None known at this time                   Pt to discharge today to [facility]. Number for report provided to RN via secure chat: [phone]. Hospital to arrange discharge transportation.     No additional CM needs for discharge     Important Message from Medicare             Contact Info       [facility]   Specialty: Emergency Medicine    [address]  [facility] [address]   Phone: [phone]       Next Steps: Follow up    Instructions: If symptoms worsen, As needed    No, Primary Doctor   Relationship: PCP - General        Next Steps: Schedule an appointment as soon as possible for a visit in 1 week(s)

## 2022-11-17 NOTE — PLAN OF CARE
[facility]     Department of Hospital Medicine     [address] [place] [address]     [phone] after hours  [phone] fax       NURSING HOME ORDERS    11/17/2022    Admit to Nursing Home:  Skilled Bed                                                  Diagnoses:  Active Hospital Problems    Diagnosis  POA    *Closed displaced intertrochanteric fracture of left femur [S72.142A]  Yes    HTN (hypertension) [I10]  Yes    Closed fracture of distal end of left radius [S52.502A]  Yes    CHARO (generalized anxiety disorder) [F41.1]  Yes      Resolved Hospital Problems   No resolved problems to display.       Allergies:Review of patient's allergies indicates:  No Known Allergies    Vitals:  Every shift (Skilled Nursing patients)    Diet: cardiac    Supplement:  1 can every three times a day with meals                         Type:  House         Acitivities: Per PT     LABS:  Per facility protocol    Nursing Precautions:      - Fall precautions per nursing home protocol   - Decubitus precautions:        -  for positioning   - Pressure reducing foam mattress   - Turn patient every two hours. Use wedge pillows to anchor patient   - Aspiration precautions        CONSULTS:      Physical Therapy to evaluate and treat     Occupational Therapy to evaluate and treat     Nutrition to evaluate and recommend diet       MISCELLANEOUS CARE:     Routine Skin for Bedridden Patients:  Apply moisture barrier cream to all    skin folds and wet areas in perineal area daily and after baths and                           all bowel movements.                      Medications: Discontinue all previous medication orders, if any. See new list below.    Current Discharge Medication List        START taking these medications    Details   acetaminophen (TYLENOL) 325 MG tablet Take 2 tablets (650 mg total) by mouth every 4 (four) hours as needed.  Refills: 0      amLODIPine (NORVASC) 5 MG  tablet Take 1 tablet (5 mg total) by mouth once daily.  Qty: 30 tablet, Refills: 11    Comments: .      aspirin 81 MG Chew Take 1 tablet (81 mg total) by mouth 2 (two) times a day.  Qty: 60 tablet, Refills: 11      guaiFENesin 100 mg/5 ml (ROBITUSSIN) 100 mg/5 mL syrup Take 10 mLs (200 mg total) by mouth every 4 (four) hours as needed for Congestion.  Refills: 0      HYDROcodone-acetaminophen (NORCO) 5-325 mg per tablet Take 1 tablet by mouth every 6 (six) hours as needed for Pain.  Qty: 10 tablet, Refills: 0    Comments: Quantity prescribed more than 7 day supply? No      polyethylene glycol (GLYCOLAX) 17 gram PwPk Take 17 g by mouth 2 (two) times daily.  Refills: 0      simethicone (MYLICON) 80 MG chewable tablet Take 1 tablet (80 mg total) by mouth 4 (four) times daily as needed for Flatulence.  Refills: 0           CONTINUE these medications which have NOT CHANGED    Details   busPIRone (BUSPAR) 10 MG tablet Take 10 mg by mouth 3 (three) times daily.      REMERON 30 mg tablet Take 30 mg by mouth every evening.                     _________________________________  Susanna Pandey MD  11/17/2022

## 2022-11-17 NOTE — PT/OT/SLP PROGRESS
Occupational Therapy   Treatment    Name: Ayde Felix  MRN: [id number]  Admitting Diagnosis:  Closed displaced intertrochanteric fracture of left femur  7 Days Post-Op    Recommendations:     Discharge Recommendations: rehabilitation facility  Discharge Equipment Recommendations:  walker, rolling (TBD at next level of care)  Barriers to discharge:  Decreased caregiver support, Inaccessible home environment    Assessment:     Ayde Felix is a 72 y.o. female with a medical diagnosis of Closed displaced intertrochanteric fracture of left femur.  She presents with the followingerformance deficits affecting function are weakness, impaired endurance, impaired self care skills, impaired functional mobility, impaired balance, pain, decreased safety awareness, impaired cardiopulmonary response to activity, orthopedic precautions.     Rehab Prognosis:  Good; patient would benefit from acute skilled OT services to address these deficits and reach maximum level of function.       Plan:     Patient to be seen 2 x/week to address the above listed problems via self-care/home management, therapeutic activities, therapeutic exercises  Plan of Care Expires: 11/25/22  Plan of Care Reviewed with: patient    Subjective     Pain/Comfort:  Pain Rating 1: 3/10  Location - Side 1: Left  Location 1: leg  Pain Addressed 1:  (activity pacing)    Objective:     Communicated with: Nurse and EPIC prior to session.  Patient found up in chair with peripheral IV, chair check, telemetry upon OT entry to room.    General Precautions: Standard, fall   Orthopedic Precautions:LLE weight bearing as tolerated, LUE weight bearing as tolerated (L UE WBAT with brace in place)   Braces: UE brace  Respiratory Status: Room air    Bed Mobility:    OOB in chair at start and end of treatment session    Functional Mobility/Transfers:  Patient completed Sit <> Stand Transfer with contact guard assistance  with  rolling walker   Patient completed Bed <> Chair  Transfer using Step Transfer technique with contact guard assistance with rolling walker  Functional Mobility: Patient completed x80ft x2 trials functional mobility with RW and CGA to increase dynamic standing balance and activity tolerance needed for ADL completion.    [facility] 6 Click ADL: 20    Treatment & Education:  Patient tolerated intervention well. Requires v/c for technique with all transfers to increase safety and independence with completion. Patient completed B UE AROM therex x10 reps, x3 planes of motion to increase functional strength and activity tolerance. Encouraged continued completion via HEP throughout the day. L FMC and edema continue to improve. Patient educated on need to call for A to transfer back to bed. Stated understanding and in agreement with POC.    Patient left up in chair with all lines intact, call button in reach, and chair alarm on    GOALS:   Multidisciplinary Problems       Occupational Therapy Goals          Problem: Occupational Therapy    Goal Priority Disciplines Outcome Interventions   Occupational Therapy Goal     OT, PT/OT Ongoing, Progressing    Description: Goals to be met by: 11/25/22     Patient will increase functional independence with ADLs by performing:    Toileting from toilet with Stand-by Assistance for hygiene and clothing management.   Toilet transfer to toilet with Stand-by Assistance.  Increased functional strength in B UE grossly by 1/2 MM grade.                       Time Tracking:     OT Date of Treatment: 11/17/22  OT Start Time: 1100  OT Stop Time: 1125  OT Total Time (min): 25 min    Billable Minutes:Therapeutic Activity 15  Therapeutic Exercise 10    11/17/2022

## 2022-11-17 NOTE — PT/OT/SLP PROGRESS
Physical Therapy Treatment    Patient Name:  Ayde Felix   MRN:  [id number]    Recommendations:     Discharge Recommendations:  rehabilitation facility   Discharge Equipment Recommendations: walker, rolling   Barriers to discharge: None    Assessment:     Ayde Felix is a 72 y.o. female admitted with a medical diagnosis of Closed displaced intertrochanteric fracture of left femur.  She presents with the following impairments/functional limitations:  weakness, impaired endurance, impaired functional mobility, gait instability, impaired balance, decreased safety awareness, decreased upper extremity function, decreased coordination, decreased lower extremity function.    Rehab Prognosis: Good; patient would benefit from acute skilled PT services to address these deficits and reach maximum level of function.    Recent Surgery: Procedure(s) (LRB):  ORIF, FRACTURE, FEMUR (Left) 7 Days Post-Op    Plan:     During this hospitalization, patient to be seen 3 x/week to address the identified rehab impairments via gait training, therapeutic exercises, therapeutic activities and progress toward the following goals:    Plan of Care Expires:  11/25/22    Subjective     Chief Complaint:   Patient/Family Comments/goals:   Pain/Comfort:  Pain Rating 1: 3/10  Location - Side 1: Left  Location 1: leg  Pain Addressed 1: Reposition, Distraction      Objective:     Communicated with NURSE PACHECO prior to session.  Patient found up in chair with telemetry, peripheral IV upon PT entry to room.     General Precautions: Standard, fall   Orthopedic Precautions:LLE WBAT, LUE weight bearing as tolerated   Braces: UE brace  Respiratory Status: Room air     Functional Mobility:  Bed Mobility:     Scooting: stand by assistance  Transfers:     Sit to Stand:  contact guard assistance with rolling walker  Bed to Chair: contact guard assistance with  rolling walker  using  Step Transfer  Gait: PT AMB 70' X 2 TRIALS WITH RW AND CGA, SLOW STEADY GAIT,  "NO LOB OR SOB ON ROOM AIR  Balance: FAIR    AM-PAC 6 CLICK MOBILITY  Turning over in bed (including adjusting bedclothes, sheets and blankets)?: 1  Sitting down on and standing up from a chair with arms (e.g., wheelchair, bedside commode, etc.): 3  Moving from lying on back to sitting on the side of the bed?: 1  Moving to and from a bed to a chair (including a wheelchair)?: 3  Need to walk in hospital room?: 3  Climbing 3-5 steps with a railing?: 1  Basic Mobility Total Score: 12     Treatment & Education:  REVIEW ROLE OF P.T. AND POC IN ACUTE CARE HOSPITAL SETTING, REVIEW RW USE AND SAFETY DURING TF'S AND GAIT, ENCOURAGED TO INCREASE TIME OOB IN CHAIR TO TOLERANCE, EDUCATED IN AND ENCOURAGED TO PERFORM BLE THEREX WHILE SEATED OR SUPINE THROUGHOUT THE DAY TO TOLERANCE: HIP FLEX/EXT, QUAD SET, LAQ, HEEL SLIDES, AP'S.  PT AGREEABLE TO REQUESTS  PT EDUCATED ON RISK FOR FALLS DUE TO GENERALIZED WEAKNESS, EDUCATED ON "CALL DON'T FALL", ENCOURAGED TO CALL FOR ASSISTANCE WITH ALL NEEDS SUCH AS BED<>CHAIR TRANSFERS OR TRIPS TO BATHROOM, PT AGREEABLE TO SAFETY PRECAUTIONS    Patient left up in chair with all lines intact, call button in reach, chair alarm on, and NURSE notified..    GOALS:   Multidisciplinary Problems       Physical Therapy Goals          Problem: Physical Therapy    Goal Priority Disciplines Outcome Goal Variances Interventions   Physical Therapy Goal     PT, PT/OT Ongoing, Progressing     Description: LTG'S TO BE MET IN 14 DAYS (11-30-22)  PT WILL REQUIRE IND FOR BED MOBILITY  PT WILL REQUIRE MOD I with RW FOR BED<>CHAIR TF'S  PT WILL '  FEET WITH RW AND CGA                         Time Tracking:     PT Received On: 11/17/22  PT Start Time: 1100     PT Stop Time: 1125  PT Total Time (min): 25 min     Billable Minutes: Gait Training 15 and Therapeutic Activity 10    Treatment Type: Treatment  PT/PTA: PT     PTA Visit Number: 0     11/17/2022  "

## 2022-11-17 NOTE — PROGRESS NOTES
[facility] - Telemetry ([facility])  Adult Nutrition  Progress Note    SUMMARY       Recommendations    Recommendation/Intervention:   1. When medically appropriate, recommend advance pt diet to High calorie/High protein diet    Goals:   1. Pt will consume >75% EEN by RD follow up  Nutrition Goal Status: new  Communication of RD Recs: other (Comment); (POC, sticky note)    Assessment and Plan:    Nutrition Problem  Underweight    Related to (etiology):   Inadequate energy intake    Signs and Symptoms (as evidenced by):   BMI 17.47    Interventions(treatment strategy):  1. When medically appropriate, recommend advance pt diet to High calorie/High protein diet  2. Collaboration of care with medical providers    Nutrition Diagnosis Status:   New     Malnutrition Assessment  Malnutrition Type: acute illness or injury  Energy Intake: moderate energy intake             Upper Arm Region (Subcutaneous Fat Loss): mild depletion (Some depth pinch but not ample; Fingers almost touch)   Anterior Thigh Region (Muscle Loss): mild depletion (Mild depression on inner thigh)       Subcutaneous Fat Loss (Final Summary): mild protein-calorie malnutrition  Muscle Loss Evaluation (Final Summary): mild protein-calorie malnutrition         Reason for Assessment    Reason For Assessment: length of stay  Diagnosis: other (see comments) (Closed displaced intertrochanteric fracture of L femur)  Relevant Medical History: Closed fracture of distal end of L femur, CHARO, HTN  General Information Comments: 72 y.o. Female admitted for closed displaced intertrochanteric fracture of L femur. Visited pt at bedside, pt was sitting up in chair. Pt reported having a good appetite, confirmed 75% PO intake of meals, not experiencing any N/V/D, no abdominal distention, denies chewing/swallowing difficulties. Pt stated she has tried Ensure at home, does not like, discussed protein/calorie benefits of Boost, pt denied. Pt reported that her normal weight is 103 lbs,  "last weighed 7 months ago, stated she has always been small. Reviewed chart: LBM 11/16; Skin: WDL ex: L hip incision, clean, dry, intact; Mike score: 20; Edema: None. Labs, meds, weight reviewed. Labs 11/11: Na (L), K (L), Calcium (L), Cl (H). No previous wt charted. NFPE performed, mild malnutrition noted. RD will continue to monitor.  Nutrition Discharge Planning: High calorie/High protein diet    Nutrition Risk Screen    Nutrition Risk Screen: no indicators present    Nutrition/Diet History    Spiritual, Cultural Beliefs, Zoroastrian Practices, Values that Affect Care: no  Food Allergies: NKFA  Factors Affecting Nutritional Intake: None identified at this time    Anthropometrics    Temp: 98 °F (36.7 °C)  Height Method: Stated  Height: 5' 7" (170.2 cm)  Height (inches): 67 in  Weight Method: Bed Scale  Weight: 50.6 kg (111 lb 8.8 oz)  Weight (lb): 111.55 lb  Ideal Body Weight (IBW), Female: 135 lb  % Ideal Body Weight, Female (lb): 82.63 %  BMI (Calculated): 17.5  BMI Grade: 17 - 18.4 protein-energy malnutrition grade I     Wt Readings from Last 15 Encounters:   11/17/22 50.6 kg (111 lb 8.8 oz)     Lab/Procedures/Meds    Pertinent Labs Reviewed: reviewed  Pertinent Medications Reviewed: reviewed  BMP  Lab Results   Component Value Date     (L) 11/11/2022    K 3.3 (L) 11/11/2022     (H) 11/11/2022    CO2 13 (L) 11/11/2022    BUN 9 11/11/2022    CREATININE 0.6 11/11/2022    CALCIUM 8.2 (L) 11/11/2022    ANIONGAP 11 11/11/2022    EGFRNORACEVR >60 11/11/2022    Scheduled Meds:   amLODIPine  5 mg Oral Daily    aspirin  81 mg Oral BID    busPIRone  10 mg Oral TID    mirtazapine  30 mg Oral QHS    polyethylene glycol  17 g Oral BID     Continuous Infusions:  PRN Meds:.acetaminophen, aluminum-magnesium hydroxide-simethicone, diphenhydrAMINE, guaiFENesin 100 mg/5 ml, hydrALAZINE, HYDROcodone-acetaminophen, magnesium oxide, magnesium oxide, melatonin, morphine, naloxone, ondansetron, potassium bicarbonate, " potassium bicarbonate, potassium bicarbonate, potassium, sodium phosphates, potassium, sodium phosphates, potassium, sodium phosphates, promethazine, simethicone, sodium chloride 0.9%   Physical Findings/Assessment         Estimated/Assessed Needs    Weight Used For Calorie Calculations: 50.6 kg (111 lb 8.8 oz)  Energy Calorie Requirements (kcal): 7101-8200 (30-35 kcal/kg ABW (Underweight))  Energy Need Method: Kcal/kg  Protein Requirements: 60-76 (1.2-1.5 g/kg ABW (Underweight))  Weight Used For Protein Calculations: 50.6 kg (111 lb 8.8 oz)  Fluid Requirements (mL): 0889-7937 (1 mL/kcal)  Estimated Fluid Requirement Method: RDA Method  RDA Method (mL): 1518  CHO Requirement: 189-221 (2994-2647 kcal/8)      Nutrition Prescription Ordered    Current Diet Order: Regular diet    Evaluation of Received Nutrient/Fluid Intake  I/O: (Net since admit)  11/17: -1504.3 mL  Energy Calories Required: not meeting needs  Protein Required: not meeting needs  Fluid Required: meeting needs  Tolerance: tolerating  % Intake of Estimated Energy Needs: 50 - 75 %  % Meal Intake: 50 - 75 %    Nutrition Risk    Level of Risk/Frequency of Follow-up: low (F/u 1 x weekly)     Monitor and Evaluation    Food and Nutrient Intake: food and beverage intake  Food and Nutrient Adminstration: diet order  Knowledge/Beliefs/Attitudes: food and nutrition knowledge/skill, beliefs and attitudes  Anthropometric Measurements: weight, weight change, body mass index  Biochemical Data, Medical Tests and Procedures: electrolyte and renal panel, gastrointestinal profile, glucose/endocrine profile, inflammatory profile, lipid profile  Nutrition-Focused Physical Findings: overall appearance     Nutrition Follow-Up    RD Follow-up?: Yes  Laura Corbett, Registration Eligible, Provisional LDN

## 2022-11-17 NOTE — PLAN OF CARE
CM met with patient at bedside per request, list of in-network SNF list provided. CM discussed facilities that have denied and informed at this time only [facility] is accepting. CM discussed alternate d/c planning options (HH). Patient states she wants to discharge to [facility]. Patient verbalized frustration that she is ready to discharge, emotional support provided. CM left voicemail with Maria M from [facility], awaiting callback.

## 2022-11-17 NOTE — PLAN OF CARE
Pt remains fall free this shift.  Pt AAOx4, verbal, clear speech.  Skin warm and dry. No new skin issues.  Telemonitoring in progress,   External catheter in place   Assist with transfers.  Bed low, side rails up x 2, wheels locked, call light in reach.  Bed alarm maintained for safety.  Patient instructed to call for assistance.  Hourly rounding completed.  24 hour chart check completed  POC updated and reviewed with pt. Will continue POC.

## 2022-11-17 NOTE — PROGRESS NOTES
'[facility])  Orthopedics  Progress Note    Patient Name: Ayde Felix  MRN: [id number]  Admission Date: 11/9/2022  Hospital Length of Stay: 8 days  Attending Provider: Susanna Pandey MD  Primary Care Provider: Primary Doctor No  Follow-up For: Procedure(s) (LRB):  ORIF, FRACTURE, FEMUR (Left)    Post-Operative Day: 7 Days Post-Op  Subjective:     Principal Problem:Closed displaced intertrochanteric fracture of left femur    Principal Orthopedic Problem:  Closed nondisplaced intertrochanteric fracture of left femur    Interval History: Ayde Felix is a 72-year-old female postop day 7 status post operative fixation of left intertrochanteric femur fracture with an intramedullary nail.  Patient is sitting up in the chair bedside today.  No new complaints.  Rehab was denied, the patient is going to skilled nursing in [place] pending insurance approval    Review of patient's allergies indicates:  No Known Allergies    Current Facility-Administered Medications   Medication    acetaminophen tablet 650 mg    aluminum-magnesium hydroxide-simethicone 200-200-20 mg/5 mL suspension 30 mL    aspirin chewable tablet 81 mg    busPIRone tablet 10 mg    diphenhydrAMINE capsule 25 mg    guaiFENesin 100 mg/5 ml syrup 200 mg    HYDROcodone-acetaminophen 5-325 mg per tablet 1 tablet    magnesium oxide tablet 800 mg    magnesium oxide tablet 800 mg    melatonin tablet 6 mg    mirtazapine tablet 30 mg    morphine injection 2 mg    naloxone 0.4 mg/mL injection 0.02 mg    ondansetron disintegrating tablet 8 mg    polyethylene glycol packet 17 g    potassium bicarbonate disintegrating tablet 35 mEq    potassium bicarbonate disintegrating tablet 50 mEq    potassium bicarbonate disintegrating tablet 60 mEq    potassium, sodium phosphates 280-160-250 mg packet 2 packet    potassium, sodium phosphates 280-160-250 mg packet 2 packet    potassium, sodium phosphates 280-160-250 mg packet 2 packet    promethazine tablet 25 mg  "   simethicone chewable tablet 80 mg    sodium chloride 0.9% flush 10 mL     Objective:     Vital Signs (Most Recent):  Temp: 98.1 °F (36.7 °C) (11/17/22 0740)  Pulse: 91 (11/17/22 0810)  Resp: 17 (11/17/22 0847)  BP: (!) 167/81 (11/17/22 0740)  SpO2: 95 % (11/17/22 0810)   Vital Signs (24h Range):  Temp:  [97.8 °F (36.6 °C)-98.8 °F (37.1 °C)] 98.1 °F (36.7 °C)  Pulse:  [79-98] 91  Resp:  [17-22] 17  SpO2:  [92 %-96 %] 95 %  BP: (129-176)/(56-81) 167/81     Weight: 50.6 kg (111 lb 8.8 oz)  Height: 5' 7" (170.2 cm)  Body mass index is 17.47 kg/m².      Intake/Output Summary (Last 24 hours) at 11/17/2022 1015  Last data filed at 11/17/2022 0400  Gross per 24 hour   Intake --   Output 350 ml   Net -350 ml       Ortho/SPM Exam  Left lower extremity:  Dressing is clean, dry, and intact  Mild edema throughout the thigh  No pain with internal/external rotation  Hip ROM full  Calf and compartments are soft and compressible  Motor exam normal   Sensation pulses intact     GEN: Well developed, well nourished female. AAOX3. No acute distress.   Head: Normocephalic, atraumatic.   Eyes: RIP  Neck: Trachea is midline, no adenopathy  Resp: Breathing unlabored.  Neuro: Motor function normal, Cranial nerves intact  Psych: Mood and affect appropriate.    Significant Labs: All pertinent labs within the past 24 hours have been reviewed.    Significant Imaging: I have reviewed and interpreted all pertinent imaging results/findings.    Assessment/Plan:     Active Diagnoses:    Diagnosis Date Noted POA    PRINCIPAL PROBLEM:  Closed displaced intertrochanteric fracture of left femur [S72.142A] 11/10/2022 Yes    Closed fracture of distal end of left radius [S52.502A] 11/10/2022 Yes    CHARO (generalized anxiety disorder) [F41.1] 11/10/2022 Yes      Problems Resolved During this Admission:     Assessment:  72-year-old female postop day 7 status post operative fixation of left intertrochanteric femur fracture with an intramedullary " nail    Plan:   Weightbearing as tolerated to the left lower extremity   PT/OT for gait training and ADLs   Patient has been accepted at [facility], pending insurance approval  Aspirin 81 mg b.i.d. for 30 days for DVT prophylaxis  Patient is ready for discharge from orthopedic standpoint   Patient has follow-up scheduled with the orthopedic trauma clinic at 2 weeks and 4 weeks postop.  Patient understands that she only needs 1 of these visits, so if the facility she is going to is willing to remove her staples then she can cancel the 2 week postop follow-up in 4 weeks    Brandon Toro PA-C  Orthopedics  [facility] - Tele[facility] ([facility])

## 2022-11-17 NOTE — SUBJECTIVE & OBJECTIVE
Interval History: Pt hypertensive. Started on amlodipine 5mg PO daily. PRN hydralazine added. Continues to have some pain but controlled. Medically stable for DC, pending placement to SNF.     Review of Systems   Constitutional:  Positive for activity change and fatigue. Negative for fever.   Respiratory:  Negative for shortness of breath.    Cardiovascular:  Negative for chest pain.   Gastrointestinal:  Negative for abdominal pain.   Musculoskeletal:  Positive for arthralgias and myalgias.   Neurological:  Negative for dizziness and headaches.   All other systems reviewed and are negative.  Objective:     Vital Signs (Most Recent):  Temp: 97.8 °F (36.6 °C) (11/17/22 1125)  Pulse: 84 (11/17/22 1125)  Resp: 18 (11/17/22 1125)  BP: (!) 182/78 (11/17/22 1125)  SpO2: 96 % (11/17/22 1125)   Vital Signs (24h Range):  Temp:  [97.8 °F (36.6 °C)-98.8 °F (37.1 °C)] 97.8 °F (36.6 °C)  Pulse:  [84-98] 84  Resp:  [17-22] 18  SpO2:  [92 %-96 %] 96 %  BP: (129-182)/(56-81) 182/78     Weight: 50.6 kg (111 lb 8.8 oz)  Body mass index is 17.47 kg/m².    Intake/Output Summary (Last 24 hours) at 11/17/2022 1237  Last data filed at 11/17/2022 0400  Gross per 24 hour   Intake --   Output 350 ml   Net -350 ml      Physical Exam  Vitals and nursing note reviewed.   Constitutional:       Appearance: Normal appearance.   HENT:      Head: Normocephalic and atraumatic.   Eyes:      Extraocular Movements: Extraocular movements intact.      Pupils: Pupils are equal, round, and reactive to light.   Cardiovascular:      Rate and Rhythm: Normal rate and regular rhythm.   Pulmonary:      Effort: Pulmonary effort is normal. No respiratory distress.   Abdominal:      General: There is no distension.   Musculoskeletal:         General: Normal range of motion.      Cervical back: Normal range of motion.   Neurological:      General: No focal deficit present.      Mental Status: She is alert and oriented to person, place, and time.   Psychiatric:          Mood and Affect: Mood normal.         Behavior: Behavior normal.       Significant Labs: All pertinent labs within the past 24 hours have been reviewed.  CBC: No results for input(s): WBC, HGB, HCT, PLT in the last 48 hours.  CMP: No results for input(s): NA, K, CL, CO2, GLU, BUN, CREATININE, CALCIUM, PROT, ALBUMIN, BILITOT, ALKPHOS, AST, ALT, ANIONGAP, EGFRNONAA in the last 48 hours.    Invalid input(s): ESTGFAFRICA    Significant Imaging: I have reviewed all pertinent imaging results/findings within the past 24 hours.

## 2022-11-17 NOTE — DISCHARGE SUMMARY
[facility] - Telemetry ([facility] Medicine  Discharge Summary      Patient Name: Ayde Felix  MRN: [id number]  Patient Class: IP- Inpatient  Admission Date: 11/9/2022  Hospital Length of Stay: 8 days  Discharge Date and Time:  11/17/2022 3:58 PM  Attending Physician: Susanna Pandey MD   Discharging Provider: Susanna Pandey MD  Primary Care Provider: Primary Doctor No      HPI:   Patient is a 72 y.o. aa female with a PMH of CHARO who presents to the Emergency Department for evaluation of left wrist and LLE pain secondary to fall at 13:00. Patient suffered a fall when she was walking upstairs. Currently complains of left wrist and left hip pain however improving. Denies any other issues.     In the ED, xrays revealed left distal radial fracture and left intertrochanteric fracture. Labs showed an h/h 9.1/29.7 otherwise unremarkable. Ortho consulted who recommended admission to  and plans for surgery in the am.       Procedure(s) (LRB):  ORIF, FRACTURE, FEMUR (Left)      Hospital Course:   Patient was admitted for closed nondisplaced intertrochanteric fx of L femur under the care of [facility] medicine. Orthopedic surgery was consulted. 11/10:  Pt scheduled for surgery today at 1pm. Made NPO - quezada cath and IV pain medication ordered.  Underwent surgical repair and tolerated the procedure well.  On 11/11 POD #2 Patient sitting up in chair at bedside,  reports feeling well, pain controlled. PT rec IPR, case management consulted.  Patient doing well postoperatively.  Was discharged to SNF in stable condition on 11/17       Goals of Care Treatment Preferences:  Code Status: Full Code      Consults:   Consults (From admission, onward)        Status Ordering Provider     Inpatient consult to Social Work  Once        Provider:  (Not yet assigned)    Completed DANIELLE LLANOS     Inpatient virtual consult to [facility] Medicine  Once        Provider:  Ferny Cifuentes MD    Completed DANIELLE LLANOS      Inpatient consult to Social Work  Once        Provider:  (Not yet assigned)    Completed DAMION LYNN     Inpatient consult to Hospitalist  Once        Provider:  Jenaro William MD    Acknowledged GEOVANNA DASH     Inpatient consult to Orthopedic Surgery  Once        Provider:  Damion Lynn MD    Completed GEOVANNA DASH.          * Closed displaced intertrochanteric fracture of left femur  Left intertrochanteric fracture on xray  Ortho notified  Plans for surgery in am  Npo  Pain control   11/10:  --surgery today  --prn analgesia  --PT/OT after surgery  --ortho following    PT rec IPR  Continue supportive management  Consult case management for placement       Final Active Diagnoses:    Diagnosis Date Noted POA    PRINCIPAL PROBLEM:  Closed displaced intertrochanteric fracture of left femur [S72.142A] 11/10/2022 Yes    HTN (hypertension) [I10] 11/17/2022 Yes    Closed fracture of distal end of left radius [S52.502A] 11/10/2022 Yes    CHARO (generalized anxiety disorder) [F41.1] 11/10/2022 Yes      Problems Resolved During this Admission:       Discharged Condition: stable    Disposition: Skilled Nursing Facility    Follow Up:   Follow-up Information     [facility] - Emergency Dept Follow up.    Specialty: Emergency Medicine  Why: If symptoms worsen, As needed  Contact information:  Satish Camacho  [facility] [address] [phone]           Primary Doctor No. Schedule an appointment as soon as possible for a visit in 1 week(s).                     Patient Instructions:      Diet Cardiac     Notify your health care provider if you experience any of the following:  temperature >100.4     Notify your health care provider if you experience any of the following:  persistent nausea and vomiting or diarrhea     Notify your health care provider if you experience any of the following:  severe uncontrolled pain     Notify your health care provider if you experience any of the following:  redness,  tenderness, or signs of infection (pain, swelling, redness, odor or green/yellow discharge around incision site)     Notify your health care provider if you experience any of the following:  difficulty breathing or increased cough     Notify your health care provider if you experience any of the following:  severe persistent headache     Notify your health care provider if you experience any of the following:  worsening rash     Notify your health care provider if you experience any of the following:  persistent dizziness, light-headedness, or visual disturbances     Notify your health care provider if you experience any of the following:  increased confusion or weakness     Send follow up & questions to   Order Comments: Patient's primary care physician: Primary Doctor No     Activity as tolerated       Significant Diagnostic Studies: Labs: BMP: No results for input(s): GLU, NA, K, CL, CO2, BUN, CREATININE, CALCIUM, MG in the last 48 hours. and CBC No results for input(s): WBC, HGB, HCT, PLT in the last 48 hours.    Pending Diagnostic Studies:     None         Medications:  Reconciled Home Medications:      Medication List      START taking these medications    acetaminophen 325 MG tablet  Commonly known as: TYLENOL  Take 2 tablets (650 mg total) by mouth every 4 (four) hours as needed.     amLODIPine 5 MG tablet  Commonly known as: NORVASC  Take 1 tablet (5 mg total) by mouth once daily.  Start taking on: November 18, 2022     aspirin 81 MG Chew  Take 1 tablet (81 mg total) by mouth 2 (two) times a day.     guaiFENesin 100 mg/5 ml 100 mg/5 mL syrup  Commonly known as: ROBITUSSIN  Take 10 mLs (200 mg total) by mouth every 4 (four) hours as needed for Congestion.     HYDROcodone-acetaminophen 5-325 mg per tablet  Commonly known as: NORCO  Take 1 tablet by mouth every 6 (six) hours as needed for Pain.     polyethylene glycol 17 gram Pwpk  Commonly known as: GLYCOLAX  Take 17 g by mouth 2 (two) times daily.      simethicone 80 MG chewable tablet  Commonly known as: MYLICON  Take 1 tablet (80 mg total) by mouth 4 (four) times daily as needed for Flatulence.        CONTINUE taking these medications    busPIRone 10 MG tablet  Commonly known as: BUSPAR  Take 10 mg by mouth 3 (three) times daily.     REMERON 30 MG tablet  Generic drug: mirtazapine  Take 30 mg by mouth every evening.            Indwelling Lines/Drains at time of discharge:   Lines/Drains/Airways     Drain  Duration           Female External Urinary Catheter 11/17/22 1426 <1 day                Time spent on the discharge of patient: 39 minutes         The attending portion of this evaluation, treatment, and documentation was performed per Susanna Pandey MD via Telemedicine AudioVisual using the secure [employer] software platform with 2 way audio/video. The provider was located off-site and the patient is located in the hospital. The aforementioned video software was utilized to document the relevant history and physical exam       Susanna Pandey MD  Department of Hospital Medicine  [facility] [facility])

## 2022-11-17 NOTE — SUBJECTIVE & OBJECTIVE
Interval History: NAEON. Doing well, accepted to [facility] however insurance denied despite peer to peer. Now pursuing SNF placement.     Review of Systems   Constitutional:  Negative for fever.   Respiratory:  Negative for shortness of breath.    Cardiovascular:  Negative for chest pain.   Gastrointestinal:  Negative for abdominal pain, nausea and vomiting.   Musculoskeletal:  Positive for arthralgias and gait problem.   Psychiatric/Behavioral:  Negative for behavioral problems.    Objective:     Vital Signs (Most Recent):  Temp: 97.8 °F (36.6 °C) (11/16/22 1941)  Pulse: 95 (11/16/22 1941)  Resp: 18 (11/16/22 2010)  BP: (!) 176/79 (11/16/22 1941)  SpO2: (!) 92 % (11/16/22 1941)   Vital Signs (24h Range):  Temp:  [97.8 °F (36.6 °C)-98.8 °F (37.1 °C)] 97.8 °F (36.6 °C)  Pulse:  [] 95  Resp:  [17-19] 18  SpO2:  [92 %-93 %] 92 %  BP: (129-176)/(61-79) 176/79     Weight: 50.6 kg (111 lb 8.8 oz)  Body mass index is 17.47 kg/m².  No intake or output data in the 24 hours ending 11/16/22 2238     Physical Exam  Constitutional:       Appearance: She is well-developed.   HENT:      Head: Normocephalic and atraumatic.   Eyes:      Conjunctiva/sclera: Conjunctivae normal.      Pupils: Pupils are equal, round, and reactive to light.   Pulmonary:      Effort: Pulmonary effort is normal. No respiratory distress.   Musculoskeletal:         General: No tenderness. Normal range of motion.   Skin:     General: Skin is warm and dry.   Neurological:      Mental Status: She is alert and oriented to person, place, and time.   Psychiatric:         Behavior: Behavior normal.       Significant Labs: All pertinent labs within the past 24 hours have been reviewed.  CBC:   No results for input(s): WBC, HGB, HCT, PLT in the last 48 hours.    CMP:   No results for input(s): NA, K, CL, CO2, GLU, BUN, CREATININE, CALCIUM, PROT, ALBUMIN, BILITOT, ALKPHOS, AST, ALT, ANIONGAP, EGFRNONAA in the last 48 hours.    Invalid input(s):  ESTGFAFRICA    Urine Studies:   No results for input(s): COLORU, APPEARANCEUA, PHUR, SPECGRAV, PROTEINUA, GLUCUA, KETONESU, BILIRUBINUA, OCCULTUA, NITRITE, UROBILINOGEN, LEUKOCYTESUR, RBCUA, WBCUA, BACTERIA, SQUAMEPITHEL, HYALINECASTS in the last 48 hours.    Invalid input(s): TUSHAR      Significant Imaging: I have reviewed all pertinent imaging results/findings within the past 24 hours.

## 2022-11-17 NOTE — PLAN OF CARE
Nutrition Recommendations 11/17:  1. When medically appropriate, recommend advance pt diet to High calorie/High protein diet  2. Collaboration of care with medical providers  Laura Corbett, Registration Eligible, Provisional LDN

## 2022-11-17 NOTE — PROGRESS NOTES
[facility] - Telemetry ([facility] Medicine  Telemedicine Progress Note    Patient Name: Ayde Felix  MRN: [id number]  Patient Class: IP- Inpatient   Admission Date: 11/9/2022  Length of Stay: 8 days  Attending Physician: Susanna Pandey MD  Primary Care Provider: Primary Doctor No          Subjective:     Principal Problem:Closed displaced intertrochanteric fracture of left femur        HPI:  Patient is a 72 y.o. aa female with a PMH of CHARO who presents to the Emergency Department for evaluation of left wrist and LLE pain secondary to fall at 13:00. Patient suffered a fall when she was walking upstairs. Currently complains of left wrist and left hip pain however improving. Denies any other issues.     In the ED, xrays revealed left distal radial fracture and left intertrochanteric fracture. Labs showed an h/h 9.1/29.7 otherwise unremarkable. Ortho consulted who recommended admission to  and plans for surgery in the am.       Overview/Hospital Course:  Patient was admitted for closed nondisplaced intertrochanteric fx of L femur under the care of [facility] medicine. Orthopedic surgery was consulted. 11/10:  Pt scheduled for surgery today at 1pm. Currently NPO - quezada cath and IV pain medication ordered  11/11 POD #2 Patient sitting up in chair at bedside,  reports feeling well, pain controlled. PT rec IPR, case management consulted. Labs reviewed- Hbg trending down, monitor closely, will transfuse for Hbg < 7.       Interval History: Pt hypertensive. Started on amlodipine 5mg PO daily. PRN hydralazine added. Continues to have some pain but controlled. Medically stable for DC, pending placement to SNF.     Review of Systems   Constitutional:  Positive for activity change and fatigue. Negative for fever.   Respiratory:  Negative for shortness of breath.    Cardiovascular:  Negative for chest pain.   Gastrointestinal:  Negative for abdominal pain.   Musculoskeletal:  Positive for arthralgias and myalgias.    Neurological:  Negative for dizziness and headaches.   All other systems reviewed and are negative.  Objective:     Vital Signs (Most Recent):  Temp: 97.8 °F (36.6 °C) (11/17/22 1125)  Pulse: 84 (11/17/22 1125)  Resp: 18 (11/17/22 1125)  BP: (!) 182/78 (11/17/22 1125)  SpO2: 96 % (11/17/22 1125)   Vital Signs (24h Range):  Temp:  [97.8 °F (36.6 °C)-98.8 °F (37.1 °C)] 97.8 °F (36.6 °C)  Pulse:  [84-98] 84  Resp:  [17-22] 18  SpO2:  [92 %-96 %] 96 %  BP: (129-182)/(56-81) 182/78     Weight: 50.6 kg (111 lb 8.8 oz)  Body mass index is 17.47 kg/m².    Intake/Output Summary (Last 24 hours) at 11/17/2022 1237  Last data filed at 11/17/2022 0400  Gross per 24 hour   Intake --   Output 350 ml   Net -350 ml      Physical Exam  Vitals and nursing note reviewed.   Constitutional:       Appearance: Normal appearance.   HENT:      Head: Normocephalic and atraumatic.   Eyes:      Extraocular Movements: Extraocular movements intact.      Pupils: Pupils are equal, round, and reactive to light.   Cardiovascular:      Rate and Rhythm: Normal rate and regular rhythm.   Pulmonary:      Effort: Pulmonary effort is normal. No respiratory distress.   Abdominal:      General: There is no distension.   Musculoskeletal:         General: Normal range of motion.      Cervical back: Normal range of motion.   Neurological:      General: No focal deficit present.      Mental Status: She is alert and oriented to person, place, and time.   Psychiatric:         Mood and Affect: Mood normal.         Behavior: Behavior normal.       Significant Labs: All pertinent labs within the past 24 hours have been reviewed.  CBC: No results for input(s): WBC, HGB, HCT, PLT in the last 48 hours.  CMP: No results for input(s): NA, K, CL, CO2, GLU, BUN, CREATININE, CALCIUM, PROT, ALBUMIN, BILITOT, ALKPHOS, AST, ALT, ANIONGAP, EGFRNONAA in the last 48 hours.    Invalid input(s): ESTGFAFRICA    Significant Imaging: I have reviewed all pertinent imaging  results/findings within the past 24 hours.      Assessment/Plan:      * Closed displaced intertrochanteric fracture of left femur  Left intertrochanteric fracture on xray  Ortho notified  Plans for surgery in am  Npo  Pain control   11/10:  --surgery today  --prn analgesia  --PT/OT after surgery  --ortho following    PT rec IPR  Continue supportive management  Consult case management for placement     HTN (hypertension)  -started on amlopdine. Hydralazine PRN  -monitor vitals q4h  -SBP goal of <160 in hospital      CHARO (generalized anxiety disorder)  Resume home buspar      Closed fracture of distal end of left radius  Currently in splint  Pain controlled        VTE Risk Mitigation (From admission, onward)         Ordered     Reason for No Pharmacological VTE Prophylaxis  Once        Question:  Reasons:  Answer:  Physician Provided (leave comment)    11/10/22 1208     Place sequential compression device  Until discontinued         11/10/22 0255                      I have completed this tele-visit with the assistance of a telepresenter.    The attending portion of this evaluation, treatment, and documentation was performed per Susanna Pandey MD via Telemedicine AudioVisual using the secure [employer] software platform with 2 way audio/video. The provider was located off-site and the patient is located in the hospital. The aforementioned video software was utilized to document the relevant history and physical exam    Susanna Pandey MD  Department of Hospital Medicine   [facility] - Telemetry ([facility])

## 2022-11-17 NOTE — NURSING
Patient resting in bed. Vital signs WDL. No complaints of pain. Tele monitor on. Patient identifiers checked. Patient is awake alert and oriented. All needs met, safety measures in place, will continue to monitor.

## 2022-11-17 NOTE — PLAN OF CARE
Patient tolerated intervention well. Functional mobility x80ft x2 trials with RW and CGA. Recommending HHOT at d/c.

## 2022-11-17 NOTE — PLAN OF CARE
[facility]ic[facility]. Nurse aware of need for COVID neg test result. Pending insurance auth at this time. Patient updated at bedside.

## 2022-11-17 NOTE — PROGRESS NOTES
[facility] - Telemetry ([facility])  [facility] Medicine  Telemedicine Progress Note    Patient Name: Ayde Felix  MRN: [id number]  Patient Class: IP- Inpatient   Admission Date: 11/9/2022  Length of Stay: 7 days  Attending Physician: Ferny Cifuentes MD  Primary Care Provider: Primary Doctor No          Subjective:     Principal Problem:Closed displaced intertrochanteric fracture of left femur        HPI:  Patient is a 72 y.o. aa female with a PMH of CHARO who presents to the Emergency Department for evaluation of left wrist and LLE pain secondary to fall at 13:00. Patient suffered a fall when she was walking upstairs. Currently complains of left wrist and left hip pain however improving. Denies any other issues.     In the ED, xrays revealed left distal radial fracture and left intertrochanteric fracture. Labs showed an h/h 9.1/29.7 otherwise unremarkable. Ortho consulted who recommended admission to  and plans for surgery in the am.       Overview/Hospital Course:  Patient was admitted for closed nondisplaced intertrochanteric fx of L femur under the care of [facility] medicine. Orthopedic surgery was consulted. 11/10:  Pt scheduled for surgery today at 1pm. Currently NPO - quezada cath and IV pain medication ordered  11/11 POD #2 Patient sitting up in chair at bedside,  reports feeling well, pain controlled. PT rec IPR, case management consulted. Labs reviewed- Hbg trending down, monitor closely, will transfuse for Hbg < 7.       Interval History: NAEON. Doing well, accepted to [facility] however insurance denied despite peer to peer. Now pursuing SNF placement.     Review of Systems   Constitutional:  Negative for fever.   Respiratory:  Negative for shortness of breath.    Cardiovascular:  Negative for chest pain.   Gastrointestinal:  Negative for abdominal pain, nausea and vomiting.   Musculoskeletal:  Positive for arthralgias and gait problem.   Psychiatric/Behavioral:  Negative for behavioral problems.     Objective:     Vital Signs (Most Recent):  Temp: 97.8 °F (36.6 °C) (11/16/22 1941)  Pulse: 95 (11/16/22 1941)  Resp: 18 (11/16/22 2010)  BP: (!) 176/79 (11/16/22 1941)  SpO2: (!) 92 % (11/16/22 1941)   Vital Signs (24h Range):  Temp:  [97.8 °F (36.6 °C)-98.8 °F (37.1 °C)] 97.8 °F (36.6 °C)  Pulse:  [] 95  Resp:  [17-19] 18  SpO2:  [92 %-93 %] 92 %  BP: (129-176)/(61-79) 176/79     Weight: 50.6 kg (111 lb 8.8 oz)  Body mass index is 17.47 kg/m².  No intake or output data in the 24 hours ending 11/16/22 2238     Physical Exam  Constitutional:       Appearance: She is well-developed.   HENT:      Head: Normocephalic and atraumatic.   Eyes:      Conjunctiva/sclera: Conjunctivae normal.      Pupils: Pupils are equal, round, and reactive to light.   Pulmonary:      Effort: Pulmonary effort is normal. No respiratory distress.   Musculoskeletal:         General: No tenderness. Normal range of motion.   Skin:     General: Skin is warm and dry.   Neurological:      Mental Status: She is alert and oriented to person, place, and time.   Psychiatric:         Behavior: Behavior normal.       Significant Labs: All pertinent labs within the past 24 hours have been reviewed.  CBC:   No results for input(s): WBC, HGB, HCT, PLT in the last 48 hours.    CMP:   No results for input(s): NA, K, CL, CO2, GLU, BUN, CREATININE, CALCIUM, PROT, ALBUMIN, BILITOT, ALKPHOS, AST, ALT, ANIONGAP, EGFRNONAA in the last 48 hours.    Invalid input(s): ESTGFAFRICA    Urine Studies:   No results for input(s): COLORU, APPEARANCEUA, PHUR, SPECGRAV, PROTEINUA, GLUCUA, KETONESU, BILIRUBINUA, OCCULTUA, NITRITE, UROBILINOGEN, LEUKOCYTESUR, RBCUA, WBCUA, BACTERIA, SQUAMEPITHEL, HYALINECASTS in the last 48 hours.    Invalid input(s): TUSHAR      Significant Imaging: I have reviewed all pertinent imaging results/findings within the past 24 hours.      Assessment/Plan:      * Closed displaced intertrochanteric fracture of left femur  Left  intertrochanteric fracture on xray  Ortho notified  Plans for surgery in am  Npo  Pain control   11/10:  --surgery today  --prn analgesia  --PT/OT after surgery  --ortho following    PT rec IPR  Continue supportive management  Consult case management for placement     CHARO (generalized anxiety disorder)  Resume home buspar      Closed fracture of distal end of left radius  Currently in splint  Pain controlled        VTE Risk Mitigation (From admission, onward)         Ordered     Reason for No Pharmacological VTE Prophylaxis  Once        Question:  Reasons:  Answer:  Physician Provided (leave comment)    11/10/22 1208     Place sequential compression device  Until discontinued         11/10/22 9721                      I have completed this tele-visit with the assistance of a telepresenter.    The attending portion of this evaluation, treatment, and documentation was performed per Ferny Cifuentes MD via Telemedicine AudioVisual using the secure Alchemy[employer] software platform with 2 way audio/video. The provider was located off-site and the patient is located in the hospital. The aforementioned video software was utilized to document the relevant history and physical exam    Ferny Cifuentes MD  Department of Hospital Medicine   [facility] - Telemetry ([facility])

## 2022-11-18 ENCOUNTER — TELEPHONE (OUTPATIENT)
Dept: ORTHOPEDICS | Facility: CLINIC | Age: 72
End: 2022-11-18
Payer: MEDICARE

## 2022-11-18 NOTE — TELEPHONE ENCOUNTER
Returned call to [facility] and left a message for her to call back to specify what orders she is requesting.

## 2022-11-18 NOTE — TELEPHONE ENCOUNTER
----- Message from Giuliano Urrutia sent at 11/18/2022  3:18 PM CST -----  Contact: [facility]  Katy with [facility] is asking for an return call in to orders being sent over for pt fax number is [phone]  , please call back at [phone] Thx CJ

## 2022-11-23 NOTE — ANESTHESIA POSTPROCEDURE EVALUATION
Anesthesia Post Evaluation    Patient: Ayde Felix    Procedure(s) Performed: Procedure(s) (LRB):  ORIF, FRACTURE, FEMUR (Left)    Final Anesthesia Type: general      Patient location during evaluation: PACU  Patient participation: Yes- Able to Participate  Level of consciousness: awake and alert and oriented  Post-procedure vital signs: reviewed and stable  Pain management: adequate  Airway patency: patent  VARGAS mitigation strategies: Verification of full reversal of neuromuscular block  PONV status at discharge: No PONV  Anesthetic complications: no      Cardiovascular status: blood pressure returned to baseline and hemodynamically stable  Respiratory status: unassisted  Hydration status: euvolemic  Follow-up not needed.          Vitals Value Taken Time   /73 11/17/22 2051   Temp 37.3 °C (99.2 °F) 11/17/22 2051   Pulse 93 11/17/22 2051   Resp 20 11/17/22 2051   SpO2 96 % 11/17/22 2051         Event Time   Out of Recovery 11/10/2022 15:58:31         Pain/Hal Score: No data recorded

## 2022-12-01 ENCOUNTER — TELEPHONE (OUTPATIENT)
Dept: ORTHOPEDICS | Facility: CLINIC | Age: 72
End: 2022-12-01

## 2022-12-01 DIAGNOSIS — S72.142A CLOSED DISPLACED INTERTROCHANTERIC FRACTURE OF LEFT FEMUR, INITIAL ENCOUNTER: Primary | ICD-10-CM

## 2022-12-01 PROCEDURE — 99024 SUTURE REMOVAL: ICD-10-PCS | Mod: ,,, | Performed by: PHYSICIAN ASSISTANT

## 2022-12-01 PROCEDURE — 99024 POSTOP FOLLOW-UP VISIT: CPT | Mod: ,,, | Performed by: PHYSICIAN ASSISTANT

## 2022-12-01 NOTE — TELEPHONE ENCOUNTER
Spoke with patient and nurse at [facility] and cancelled her appointment for today due to the provider being in surgery, also faxed an order to have her staples/ sutures removed today and patient will keep her next appointment on 12/14/2022 as scheduled.  Fax [phone]

## 2022-12-05 ENCOUNTER — TELEPHONE (OUTPATIENT)
Dept: INTERNAL MEDICINE | Facility: CLINIC | Age: 72
End: 2022-12-05
Payer: MEDICARE

## 2022-12-06 DIAGNOSIS — R10.2 PAIN IN PELVIS: Primary | ICD-10-CM

## 2022-12-14 ENCOUNTER — HOSPITAL ENCOUNTER (OUTPATIENT)
Dept: RADIOLOGY | Facility: HOSPITAL | Age: 72
Discharge: HOME | End: 2022-12-14
Attending: ORTHOPAEDIC SURGERY
Payer: MEDICARE

## 2022-12-14 ENCOUNTER — OFFICE VISIT (OUTPATIENT)
Dept: ORTHOPEDICS | Facility: CLINIC | Age: 72
End: 2022-12-14
Payer: MEDICARE

## 2022-12-14 VITALS — WEIGHT: 111 LBS | BODY MASS INDEX: 17.42 KG/M2 | HEIGHT: 67 IN

## 2022-12-14 DIAGNOSIS — M89.8X5 PAIN OF LEFT FEMUR: ICD-10-CM

## 2022-12-14 DIAGNOSIS — S72.142D CLOSED DISPLACED INTERTROCHANTERIC FRACTURE OF LEFT FEMUR WITH ROUTINE HEALING, SUBSEQUENT ENCOUNTER: Primary | ICD-10-CM

## 2022-12-14 DIAGNOSIS — M89.8X5 PAIN OF LEFT FEMUR: Primary | ICD-10-CM

## 2022-12-14 DIAGNOSIS — R10.2 PAIN IN PELVIS: ICD-10-CM

## 2022-12-14 PROCEDURE — 73552 X-RAY EXAM OF FEMUR 2/>: CPT | Mod: TC,LT

## 2022-12-14 PROCEDURE — 72170 XR PELVIS ROUTINE AP: ICD-10-PCS | Mod: 26,,, | Performed by: RADIOLOGY

## 2022-12-14 PROCEDURE — 1159F MED LIST DOCD IN RCRD: CPT | Mod: CPTII,S$GLB,, | Performed by: ORTHOPAEDIC SURGERY

## 2022-12-14 PROCEDURE — 3288F FALL RISK ASSESSMENT DOCD: CPT | Mod: CPTII,S$GLB,, | Performed by: ORTHOPAEDIC SURGERY

## 2022-12-14 PROCEDURE — 99024 POSTOP FOLLOW-UP VISIT: CPT | Mod: S$GLB,,, | Performed by: ORTHOPAEDIC SURGERY

## 2022-12-14 PROCEDURE — 1125F PR PAIN SEVERITY QUANTIFIED, PAIN PRESENT: ICD-10-PCS | Mod: CPTII,S$GLB,, | Performed by: ORTHOPAEDIC SURGERY

## 2022-12-14 PROCEDURE — 72170 X-RAY EXAM OF PELVIS: CPT | Mod: TC

## 2022-12-14 PROCEDURE — 3008F BODY MASS INDEX DOCD: CPT | Mod: CPTII,S$GLB,, | Performed by: ORTHOPAEDIC SURGERY

## 2022-12-14 PROCEDURE — 73552 X-RAY EXAM OF FEMUR 2/>: CPT | Mod: 26,LT,, | Performed by: RADIOLOGY

## 2022-12-14 PROCEDURE — 72170 X-RAY EXAM OF PELVIS: CPT | Mod: 26,,, | Performed by: RADIOLOGY

## 2022-12-14 PROCEDURE — 1125F AMNT PAIN NOTED PAIN PRSNT: CPT | Mod: CPTII,S$GLB,, | Performed by: ORTHOPAEDIC SURGERY

## 2022-12-14 PROCEDURE — 3008F PR BODY MASS INDEX (BMI) DOCUMENTED: ICD-10-PCS | Mod: CPTII,S$GLB,, | Performed by: ORTHOPAEDIC SURGERY

## 2022-12-14 PROCEDURE — 1160F PR REVIEW ALL MEDS BY PRESCRIBER/CLIN PHARMACIST DOCUMENTED: ICD-10-PCS | Mod: CPTII,S$GLB,, | Performed by: ORTHOPAEDIC SURGERY

## 2022-12-14 PROCEDURE — 3288F PR FALLS RISK ASSESSMENT DOCUMENTED: ICD-10-PCS | Mod: CPTII,S$GLB,, | Performed by: ORTHOPAEDIC SURGERY

## 2022-12-14 PROCEDURE — 99024 PR POST-OP FOLLOW-UP VISIT: ICD-10-PCS | Mod: S$GLB,,, | Performed by: ORTHOPAEDIC SURGERY

## 2022-12-14 PROCEDURE — 99999 PR PBB SHADOW E&M-EST. PATIENT-LVL III: ICD-10-PCS | Mod: PBBFAC,,, | Performed by: ORTHOPAEDIC SURGERY

## 2022-12-14 PROCEDURE — 73552 XR FEMUR 2 VIEW LEFT: ICD-10-PCS | Mod: 26,LT,, | Performed by: RADIOLOGY

## 2022-12-14 PROCEDURE — 99999 PR PBB SHADOW E&M-EST. PATIENT-LVL III: CPT | Mod: PBBFAC,,, | Performed by: ORTHOPAEDIC SURGERY

## 2022-12-14 PROCEDURE — 1160F RVW MEDS BY RX/DR IN RCRD: CPT | Mod: CPTII,S$GLB,, | Performed by: ORTHOPAEDIC SURGERY

## 2022-12-14 PROCEDURE — 1159F PR MEDICATION LIST DOCUMENTED IN MEDICAL RECORD: ICD-10-PCS | Mod: CPTII,S$GLB,, | Performed by: ORTHOPAEDIC SURGERY

## 2022-12-14 PROCEDURE — 1101F PR PT FALLS ASSESS DOC 0-1 FALLS W/OUT INJ PAST YR: ICD-10-PCS | Mod: CPTII,S$GLB,, | Performed by: ORTHOPAEDIC SURGERY

## 2022-12-14 PROCEDURE — 1101F PT FALLS ASSESS-DOCD LE1/YR: CPT | Mod: CPTII,S$GLB,, | Performed by: ORTHOPAEDIC SURGERY

## 2022-12-14 NOTE — PATIENT INSTRUCTIONS
Healing fracture left intertrochanteric region of femur.    Referred to outpatient physical therapy for strengthening, gait training, may advance to a cane as strength and stability allow.      Return here for x-rays in 4 weeks

## 2022-12-14 NOTE — PROGRESS NOTES
Subjective:     Patient ID: Ayde Felix is a 72 y.o. female.    Chief Complaint: Pain and Post-op Evaluation of the Left Femur      HPI:  The patient returns for follow-up of left intertrochanteric femur fracture with surgical repair on November 10.  Brought in by her son.  She says she is doing fine.  She did inpatient rehab and is now at her son's home.  She is been getting home health therapy and wonders about transitioning to outpatient therapy.  Denies any wrist pain.  Does take Tylenol when needed for hip discomfort.    History reviewed. No pertinent past medical history.  Past Surgical History:   Procedure Laterality Date    ORIF FEMUR FRACTURE Left 11/10/2022    Procedure: ORIF, FRACTURE, FEMUR;  Surgeon: Deni Lynn MD;  Location: [facility];  Service: Orthopedics;  Laterality: Left;     History reviewed. No pertinent family history.  Social History     Socioeconomic History    Marital status: Single   Tobacco Use    Smoking status: Every Day     Packs/day: 0.50     Types: Cigarettes    Smokeless tobacco: Never     Medication List with Changes/Refills   Current Medications    ACETAMINOPHEN (TYLENOL) 325 MG TABLET    Take 2 tablets (650 mg total) by mouth every 4 (four) hours as needed.    AMLODIPINE (NORVASC) 5 MG TABLET    Take 1 tablet (5 mg total) by mouth once daily.    ASPIRIN 81 MG CHEW    Take 1 tablet (81 mg total) by mouth 2 (two) times a day.    BUSPIRONE (BUSPAR) 10 MG TABLET    Take 10 mg by mouth 3 (three) times daily.    POLYETHYLENE GLYCOL (GLYCOLAX) 17 GRAM PWPK    Take 17 g by mouth 2 (two) times daily.    REMERON 30 MG TABLET    Take 30 mg by mouth every evening.    SIMETHICONE (MYLICON) 80 MG CHEWABLE TABLET    Take 1 tablet (80 mg total) by mouth 4 (four) times daily as needed for Flatulence.   Discontinued Medications    HYDROCODONE-ACETAMINOPHEN (NORCO) 5-325 MG PER TABLET    Take 1 tablet by mouth every 6 (six) hours as needed for Pain.     Review of patient's allergies  "indicates:  No Known Allergies  ROS     Objective:   Body mass index is 17.39 kg/m².  Vitals:    12/14/22 0923   Weight: 50.3 kg (111 lb)   Height: 5' 7" (1.702 m)   PainSc:   5   PainLoc: Leg       PHYSICAL EXAM:  Well-developed thin female in no acute distress.  She is awake, alert, oriented, cooperative with evaluation.  Comes in walking with rolling walker with a slight limp    Left hip is nontender.  Good range of motion, good strength     X-rays of the pelvis and left femur are reviewed.  There is a healed intertrochanteric femur fracture with intramedullary nail fixation, an Arthrex/AOS/ES device.  There is callus at the medial intertrochanteric area.  No fracture line is visible.    Closed displaced intertrochanteric fracture of left femur with routine healing, subsequent encounter  -     Ambulatory referral/consult to Physical/Occupational Therapy; Future; Expected date: 12/21/2022        Plan:  The patient is recovering satisfactorily.  She can continue with therapy on outpatient basis and prescription is provided for that.  Return here with x-rays of the hip in 4 weeks    Patient Instructions   Healing fracture left intertrochanteric region of femur.    Referred to outpatient physical therapy for strengthening, gait training, may advance to a cane as strength and stability allow.      Return here for x-rays in 4 weeks           Deni Lynn MD, FAAOS [facility], Orthopedic Trauma Service  [place]    "

## 2023-01-09 ENCOUNTER — OFFICE VISIT (OUTPATIENT)
Dept: INTERNAL MEDICINE | Facility: CLINIC | Age: 73
End: 2023-01-09
Payer: MEDICARE

## 2023-01-09 ENCOUNTER — LAB VISIT (OUTPATIENT)
Dept: LAB | Facility: HOSPITAL | Age: 73
End: 2023-01-09
Attending: FAMILY MEDICINE
Payer: MEDICARE

## 2023-01-09 VITALS
SYSTOLIC BLOOD PRESSURE: 132 MMHG | DIASTOLIC BLOOD PRESSURE: 70 MMHG | HEIGHT: 67 IN | BODY MASS INDEX: 15.81 KG/M2 | OXYGEN SATURATION: 100 % | HEART RATE: 69 BPM | WEIGHT: 100.75 LBS

## 2023-01-09 DIAGNOSIS — I10 ESSENTIAL HYPERTENSION: ICD-10-CM

## 2023-01-09 DIAGNOSIS — Z11.59 NEED FOR HEPATITIS C SCREENING TEST: ICD-10-CM

## 2023-01-09 DIAGNOSIS — Z79.899 ENCOUNTER FOR LONG-TERM (CURRENT) USE OF OTHER MEDICATIONS: ICD-10-CM

## 2023-01-09 DIAGNOSIS — F41.1 GAD (GENERALIZED ANXIETY DISORDER): ICD-10-CM

## 2023-01-09 DIAGNOSIS — Z12.31 ENCOUNTER FOR SCREENING MAMMOGRAM FOR BREAST CANCER: ICD-10-CM

## 2023-01-09 DIAGNOSIS — Z76.89 ENCOUNTER TO ESTABLISH CARE WITH NEW DOCTOR: Primary | ICD-10-CM

## 2023-01-09 LAB
ALBUMIN SERPL BCP-MCNC: 4.4 G/DL (ref 3.5–5.2)
ALP SERPL-CCNC: 195 U/L (ref 55–135)
ALT SERPL W/O P-5'-P-CCNC: 10 U/L (ref 10–44)
ANION GAP SERPL CALC-SCNC: 13 MMOL/L (ref 8–16)
AST SERPL-CCNC: 21 U/L (ref 10–40)
BILIRUB SERPL-MCNC: 0.5 MG/DL (ref 0.1–1)
BUN SERPL-MCNC: 7 MG/DL (ref 8–23)
CALCIUM SERPL-MCNC: 9.8 MG/DL (ref 8.7–10.5)
CHLORIDE SERPL-SCNC: 106 MMOL/L (ref 95–110)
CHOLEST SERPL-MCNC: 172 MG/DL (ref 120–199)
CHOLEST/HDLC SERPL: 3.2 {RATIO} (ref 2–5)
CO2 SERPL-SCNC: 20 MMOL/L (ref 23–29)
CREAT SERPL-MCNC: 0.7 MG/DL (ref 0.5–1.4)
ERYTHROCYTE [DISTWIDTH] IN BLOOD BY AUTOMATED COUNT: 19.3 % (ref 11.5–14.5)
EST. GFR  (NO RACE VARIABLE): >60 ML/MIN/1.73 M^2
ESTIMATED AVG GLUCOSE: 111 MG/DL (ref 68–131)
GLUCOSE SERPL-MCNC: 105 MG/DL (ref 70–110)
HBA1C MFR BLD: 5.5 % (ref 4–5.6)
HCT VFR BLD AUTO: 32.5 % (ref 37–48.5)
HCV AB SERPL QL IA: NORMAL
HDLC SERPL-MCNC: 54 MG/DL (ref 40–75)
HDLC SERPL: 31.4 % (ref 20–50)
HGB BLD-MCNC: 9.4 G/DL (ref 12–16)
LDLC SERPL CALC-MCNC: 98.6 MG/DL (ref 63–159)
MCH RBC QN AUTO: 21.7 PG (ref 27–31)
MCHC RBC AUTO-ENTMCNC: 28.9 G/DL (ref 32–36)
MCV RBC AUTO: 75 FL (ref 82–98)
NONHDLC SERPL-MCNC: 118 MG/DL
PLATELET # BLD AUTO: 324 K/UL (ref 150–450)
PMV BLD AUTO: 11.6 FL (ref 9.2–12.9)
POTASSIUM SERPL-SCNC: 4.3 MMOL/L (ref 3.5–5.1)
PROT SERPL-MCNC: 7.1 G/DL (ref 6–8.4)
RBC # BLD AUTO: 4.34 M/UL (ref 4–5.4)
SODIUM SERPL-SCNC: 139 MMOL/L (ref 136–145)
TRIGL SERPL-MCNC: 97 MG/DL (ref 30–150)
TSH SERPL DL<=0.005 MIU/L-ACNC: 2.2 UIU/ML (ref 0.4–4)
WBC # BLD AUTO: 4.02 K/UL (ref 3.9–12.7)

## 2023-01-09 PROCEDURE — 3078F DIAST BP <80 MM HG: CPT | Mod: CPTII,S$GLB,, | Performed by: FAMILY MEDICINE

## 2023-01-09 PROCEDURE — 3075F PR MOST RECENT SYSTOLIC BLOOD PRESS GE 130-139MM HG: ICD-10-PCS | Mod: CPTII,S$GLB,, | Performed by: FAMILY MEDICINE

## 2023-01-09 PROCEDURE — 83036 HEMOGLOBIN GLYCOSYLATED A1C: CPT | Performed by: FAMILY MEDICINE

## 2023-01-09 PROCEDURE — 1100F PR PT FALLS ASSESS DOC 2+ FALLS/FALL W/INJURY/YR: ICD-10-PCS | Mod: CPTII,S$GLB,, | Performed by: FAMILY MEDICINE

## 2023-01-09 PROCEDURE — 36415 COLL VENOUS BLD VENIPUNCTURE: CPT | Performed by: FAMILY MEDICINE

## 2023-01-09 PROCEDURE — 3288F FALL RISK ASSESSMENT DOCD: CPT | Mod: CPTII,S$GLB,, | Performed by: FAMILY MEDICINE

## 2023-01-09 PROCEDURE — 3008F BODY MASS INDEX DOCD: CPT | Mod: CPTII,S$GLB,, | Performed by: FAMILY MEDICINE

## 2023-01-09 PROCEDURE — 1159F PR MEDICATION LIST DOCUMENTED IN MEDICAL RECORD: ICD-10-PCS | Mod: CPTII,S$GLB,, | Performed by: FAMILY MEDICINE

## 2023-01-09 PROCEDURE — 86803 HEPATITIS C AB TEST: CPT | Performed by: FAMILY MEDICINE

## 2023-01-09 PROCEDURE — 80053 COMPREHEN METABOLIC PANEL: CPT | Performed by: FAMILY MEDICINE

## 2023-01-09 PROCEDURE — 3288F PR FALLS RISK ASSESSMENT DOCUMENTED: ICD-10-PCS | Mod: CPTII,S$GLB,, | Performed by: FAMILY MEDICINE

## 2023-01-09 PROCEDURE — 1126F PR PAIN SEVERITY QUANTIFIED, NO PAIN PRESENT: ICD-10-PCS | Mod: CPTII,S$GLB,, | Performed by: FAMILY MEDICINE

## 2023-01-09 PROCEDURE — 1160F PR REVIEW ALL MEDS BY PRESCRIBER/CLIN PHARMACIST DOCUMENTED: ICD-10-PCS | Mod: CPTII,S$GLB,, | Performed by: FAMILY MEDICINE

## 2023-01-09 PROCEDURE — 80061 LIPID PANEL: CPT | Performed by: FAMILY MEDICINE

## 2023-01-09 PROCEDURE — 1100F PTFALLS ASSESS-DOCD GE2>/YR: CPT | Mod: CPTII,S$GLB,, | Performed by: FAMILY MEDICINE

## 2023-01-09 PROCEDURE — 1160F RVW MEDS BY RX/DR IN RCRD: CPT | Mod: CPTII,S$GLB,, | Performed by: FAMILY MEDICINE

## 2023-01-09 PROCEDURE — 84443 ASSAY THYROID STIM HORMONE: CPT | Performed by: FAMILY MEDICINE

## 2023-01-09 PROCEDURE — 3078F PR MOST RECENT DIASTOLIC BLOOD PRESSURE < 80 MM HG: ICD-10-PCS | Mod: CPTII,S$GLB,, | Performed by: FAMILY MEDICINE

## 2023-01-09 PROCEDURE — 99999 PR PBB SHADOW E&M-EST. PATIENT-LVL III: CPT | Mod: PBBFAC,,, | Performed by: FAMILY MEDICINE

## 2023-01-09 PROCEDURE — 85027 COMPLETE CBC AUTOMATED: CPT | Performed by: FAMILY MEDICINE

## 2023-01-09 PROCEDURE — 3008F PR BODY MASS INDEX (BMI) DOCUMENTED: ICD-10-PCS | Mod: CPTII,S$GLB,, | Performed by: FAMILY MEDICINE

## 2023-01-09 PROCEDURE — 99214 PR OFFICE/OUTPT VISIT, EST, LEVL IV, 30-39 MIN: ICD-10-PCS | Mod: S$GLB,,, | Performed by: FAMILY MEDICINE

## 2023-01-09 PROCEDURE — 1159F MED LIST DOCD IN RCRD: CPT | Mod: CPTII,S$GLB,, | Performed by: FAMILY MEDICINE

## 2023-01-09 PROCEDURE — 1126F AMNT PAIN NOTED NONE PRSNT: CPT | Mod: CPTII,S$GLB,, | Performed by: FAMILY MEDICINE

## 2023-01-09 PROCEDURE — 3075F SYST BP GE 130 - 139MM HG: CPT | Mod: CPTII,S$GLB,, | Performed by: FAMILY MEDICINE

## 2023-01-09 PROCEDURE — 99214 OFFICE O/P EST MOD 30 MIN: CPT | Mod: S$GLB,,, | Performed by: FAMILY MEDICINE

## 2023-01-09 PROCEDURE — 99999 PR PBB SHADOW E&M-EST. PATIENT-LVL III: ICD-10-PCS | Mod: PBBFAC,,, | Performed by: FAMILY MEDICINE

## 2023-01-09 RX ORDER — AMLODIPINE BESYLATE 5 MG/1
5 TABLET ORAL DAILY
Qty: 30 TABLET | Refills: 11 | Status: SHIPPED | OUTPATIENT
Start: 2023-01-09 | End: 2024-01-09

## 2023-01-09 RX ORDER — LORAZEPAM 0.5 MG/1
0.5 TABLET ORAL
COMMUNITY
Start: 2022-07-25 | End: 2023-01-09 | Stop reason: SDUPTHER

## 2023-01-09 RX ORDER — LORAZEPAM 0.5 MG/1
0.5 TABLET ORAL 3 TIMES DAILY PRN
Qty: 90 TABLET | Refills: 0 | Status: SHIPPED | OUTPATIENT
Start: 2023-01-09 | End: 2023-02-08

## 2023-01-09 RX ORDER — BUSPIRONE HYDROCHLORIDE 10 MG/1
10 TABLET ORAL 3 TIMES DAILY
Qty: 90 TABLET | Refills: 11 | Status: SHIPPED | OUTPATIENT
Start: 2023-01-09

## 2023-01-09 RX ORDER — MIRTAZAPINE 30 MG/1
30 TABLET, FILM COATED ORAL NIGHTLY
Qty: 30 TABLET | Refills: 11 | Status: SHIPPED | OUTPATIENT
Start: 2023-01-09 | End: 2024-01-09

## 2023-01-09 NOTE — PROGRESS NOTES
Subjective:       Patient ID: Ayde Felix is a 72 y.o. female.    Chief Complaint: Establish Care    HPI    Ayde Felix is a 72 y.o. female to [facility].     Coming from , wants to be back home near grandchildren but here in [place] with son recovering from surgery.    Post op appt w/ ortho this week.  Involved with phys therapy   Has walker with her today    #Cards: HTN  - reg: Norvasc 5 qd    #Tobacco use  - quit 11/2022    #Psych: Anxiety    #BMI 15    Review of Systems   Constitutional:  Negative for appetite change, fatigue and fever.   HENT:  Negative for congestion.    Respiratory:  Negative for cough and shortness of breath.    Cardiovascular:  Negative for chest pain and palpitations.   Gastrointestinal:  Negative for abdominal pain, constipation, diarrhea, nausea and vomiting.   Genitourinary:  Negative for dysuria.   Musculoskeletal:  Positive for arthralgias. Negative for back pain.   Skin:  Negative for rash.   Neurological:  Negative for weakness, numbness and headaches.       History reviewed. No pertinent past medical history.     Prior to Admission medications    Medication Sig Start Date End Date Taking? Authorizing Provider   acetaminophen (TYLENOL) 325 MG tablet Take 2 tablets (650 mg total) by mouth every 4 (four) hours as needed. 11/17/22   Susanna Pandey MD   amLODIPine (NORVASC) 5 MG tablet Take 1 tablet (5 mg total) by mouth once daily. 11/18/22 11/18/23  Susanna Pandey MD   aspirin 81 MG Chew Take 1 tablet (81 mg total) by mouth 2 (two) times a day. 11/17/22 11/17/23  Susanna Pandey MD   busPIRone (BUSPAR) 10 MG tablet Take 10 mg by mouth 3 (three) times daily. 6/23/22   Historical Provider   polyethylene glycol (GLYCOLAX) 17 gram PwPk Take 17 g by mouth 2 (two) times daily. 11/17/22   Susanna Pandey MD   REMERON 30 mg tablet Take 30 mg by mouth every evening. 6/23/22   Historical Provider   simethicone (MYLICON) 80 MG chewable tablet Take 1 tablet (80 mg total) by mouth 4  "(four) times daily as needed for Flatulence. 11/17/22   Susanna Pandey MD        Past medical history, surgical history, and family medical history reviewed and updated as appropriate.    Medications and allergies reviewed.     Objective:          Vitals:    01/09/23 0820   BP: 132/70   BP Location: Right arm   Patient Position: Sitting   Pulse: 69   SpO2: 100%   Weight: 45.7 kg (100 lb 12 oz)   Height: 5' 7" (1.702 m)     Body mass index is 15.78 kg/m².  Physical Exam  Vitals and nursing note reviewed.   Constitutional:       General: She is not in acute distress.     Appearance: Normal appearance.   Eyes:      Extraocular Movements: Extraocular movements intact.   Cardiovascular:      Rate and Rhythm: Normal rate and regular rhythm.      Pulses: Normal pulses.      Heart sounds: Normal heart sounds. No murmur heard.  Pulmonary:      Effort: Pulmonary effort is normal. No respiratory distress.   Neurological:      Mental Status: She is alert and oriented to person, place, and time.   Psychiatric:         Mood and Affect: Mood normal.         Behavior: Behavior normal.       Lab Results   Component Value Date    WBC 9.01 11/11/2022    HGB 7.2 (L) 11/11/2022    HCT 24.5 (L) 11/11/2022     11/11/2022    ALT 8 (L) 11/09/2022    AST 19 11/09/2022     (L) 11/11/2022    K 3.3 (L) 11/11/2022     (H) 11/11/2022    CREATININE 0.6 11/11/2022    BUN 9 11/11/2022    CO2 13 (L) 11/11/2022    INR 1.0 11/09/2022       Assessment:       1. Encounter to establish care with new doctor    2. Encounter for long-term (current) use of other medications    3. Essential hypertension    4. CHARO (generalized anxiety disorder)    5. Encounter for screening mammogram for breast cancer    6. Need for hepatitis C screening test          Plan:   1. Encounter to establish care with new doctor    2. Encounter for long-term (current) use of other medications  -     Comprehensive Metabolic Panel; Future; Expected date: " 01/09/2023  -     CBC Without Differential; Future; Expected date: 01/09/2023  -     Lipid Panel; Future; Expected date: 01/09/2023  -     Hemoglobin A1C; Future; Expected date: 01/09/2023  -     TSH; Future; Expected date: 01/09/2023    3. Essential hypertension  -     amLODIPine (NORVASC) 5 MG tablet; Take 1 tablet (5 mg total) by mouth once daily.  Dispense: 30 tablet; Refill: 11    4. CHARO (generalized anxiety disorder)  -     busPIRone (BUSPAR) 10 MG tablet; Take 1 tablet (10 mg total) by mouth 3 (three) times daily.  Dispense: 90 tablet; Refill: 11  -     mirtazapine (REMERON) 30 MG tablet; Take 1 tablet (30 mg total) by mouth every evening.  Dispense: 30 tablet; Refill: 11  -     LORazepam (ATIVAN) 0.5 MG tablet; Take 1 tablet (0.5 mg total) by mouth 3 (three) times daily as needed for Anxiety.  Dispense: 90 tablet; Refill: 0    5. Encounter for screening mammogram for breast cancer  -     Mammo Digital Screening Bilat w/ Jak; Future; Expected date: 01/09/2023    6. Need for hepatitis C screening test  -     Hepatitis C Antibody; Future; Expected date: 01/09/2023        Health maintenance reviewed with patient.     Follow up in about 4 months (around 5/9/2023).    Jai Hammer MD  Family Medicine / Primary Care  [facility]  1/9/2023

## 2023-01-10 ENCOUNTER — PATIENT MESSAGE (OUTPATIENT)
Dept: ADMINISTRATIVE | Facility: HOSPITAL | Age: 73
End: 2023-01-10
Payer: MEDICARE

## 2023-01-10 DIAGNOSIS — R10.2 PAIN IN PELVIS: Primary | ICD-10-CM

## 2023-01-11 ENCOUNTER — HOSPITAL ENCOUNTER (OUTPATIENT)
Dept: RADIOLOGY | Facility: HOSPITAL | Age: 73
Discharge: HOME | End: 2023-01-11
Attending: ORTHOPAEDIC SURGERY
Payer: MEDICARE

## 2023-01-11 ENCOUNTER — OFFICE VISIT (OUTPATIENT)
Dept: ORTHOPEDICS | Facility: CLINIC | Age: 73
End: 2023-01-11
Payer: MEDICARE

## 2023-01-11 VITALS
HEART RATE: 79 BPM | BODY MASS INDEX: 15.7 KG/M2 | SYSTOLIC BLOOD PRESSURE: 128 MMHG | HEIGHT: 67 IN | WEIGHT: 100 LBS | DIASTOLIC BLOOD PRESSURE: 72 MMHG

## 2023-01-11 DIAGNOSIS — S72.142D CLOSED DISPLACED INTERTROCHANTERIC FRACTURE OF LEFT FEMUR WITH ROUTINE HEALING, SUBSEQUENT ENCOUNTER: Primary | ICD-10-CM

## 2023-01-11 DIAGNOSIS — M89.8X5 PAIN OF LEFT FEMUR: Primary | ICD-10-CM

## 2023-01-11 DIAGNOSIS — R10.2 PAIN IN PELVIS: ICD-10-CM

## 2023-01-11 DIAGNOSIS — Z72.0 TOBACCO USE: ICD-10-CM

## 2023-01-11 DIAGNOSIS — M89.8X5 PAIN OF LEFT FEMUR: ICD-10-CM

## 2023-01-11 PROCEDURE — 3288F PR FALLS RISK ASSESSMENT DOCUMENTED: ICD-10-PCS | Mod: CPTII,S$GLB,, | Performed by: ORTHOPAEDIC SURGERY

## 2023-01-11 PROCEDURE — 1100F PTFALLS ASSESS-DOCD GE2>/YR: CPT | Mod: CPTII,S$GLB,, | Performed by: ORTHOPAEDIC SURGERY

## 2023-01-11 PROCEDURE — 3074F PR MOST RECENT SYSTOLIC BLOOD PRESSURE < 130 MM HG: ICD-10-PCS | Mod: CPTII,S$GLB,, | Performed by: ORTHOPAEDIC SURGERY

## 2023-01-11 PROCEDURE — 1100F PR PT FALLS ASSESS DOC 2+ FALLS/FALL W/INJURY/YR: ICD-10-PCS | Mod: CPTII,S$GLB,, | Performed by: ORTHOPAEDIC SURGERY

## 2023-01-11 PROCEDURE — 1160F RVW MEDS BY RX/DR IN RCRD: CPT | Mod: CPTII,S$GLB,, | Performed by: ORTHOPAEDIC SURGERY

## 2023-01-11 PROCEDURE — 3044F PR MOST RECENT HEMOGLOBIN A1C LEVEL <7.0%: ICD-10-PCS | Mod: CPTII,S$GLB,, | Performed by: ORTHOPAEDIC SURGERY

## 2023-01-11 PROCEDURE — 3008F PR BODY MASS INDEX (BMI) DOCUMENTED: ICD-10-PCS | Mod: CPTII,S$GLB,, | Performed by: ORTHOPAEDIC SURGERY

## 2023-01-11 PROCEDURE — 73552 XR FEMUR 2 VIEW LEFT: ICD-10-PCS | Mod: 26,LT,, | Performed by: RADIOLOGY

## 2023-01-11 PROCEDURE — 3044F HG A1C LEVEL LT 7.0%: CPT | Mod: CPTII,S$GLB,, | Performed by: ORTHOPAEDIC SURGERY

## 2023-01-11 PROCEDURE — 99024 POSTOP FOLLOW-UP VISIT: CPT | Mod: S$GLB,,, | Performed by: ORTHOPAEDIC SURGERY

## 2023-01-11 PROCEDURE — 72170 X-RAY EXAM OF PELVIS: CPT | Mod: 26,,, | Performed by: RADIOLOGY

## 2023-01-11 PROCEDURE — 72170 XR PELVIS ROUTINE AP: ICD-10-PCS | Mod: 26,,, | Performed by: RADIOLOGY

## 2023-01-11 PROCEDURE — 73552 X-RAY EXAM OF FEMUR 2/>: CPT | Mod: TC,LT

## 2023-01-11 PROCEDURE — 1125F PR PAIN SEVERITY QUANTIFIED, PAIN PRESENT: ICD-10-PCS | Mod: CPTII,S$GLB,, | Performed by: ORTHOPAEDIC SURGERY

## 2023-01-11 PROCEDURE — 3008F BODY MASS INDEX DOCD: CPT | Mod: CPTII,S$GLB,, | Performed by: ORTHOPAEDIC SURGERY

## 2023-01-11 PROCEDURE — 99999 PR PBB SHADOW E&M-EST. PATIENT-LVL III: ICD-10-PCS | Mod: PBBFAC,,, | Performed by: ORTHOPAEDIC SURGERY

## 2023-01-11 PROCEDURE — 72170 X-RAY EXAM OF PELVIS: CPT | Mod: TC

## 2023-01-11 PROCEDURE — 99024 PR POST-OP FOLLOW-UP VISIT: ICD-10-PCS | Mod: S$GLB,,, | Performed by: ORTHOPAEDIC SURGERY

## 2023-01-11 PROCEDURE — 99999 PR PBB SHADOW E&M-EST. PATIENT-LVL III: CPT | Mod: PBBFAC,,, | Performed by: ORTHOPAEDIC SURGERY

## 2023-01-11 PROCEDURE — 1160F PR REVIEW ALL MEDS BY PRESCRIBER/CLIN PHARMACIST DOCUMENTED: ICD-10-PCS | Mod: CPTII,S$GLB,, | Performed by: ORTHOPAEDIC SURGERY

## 2023-01-11 PROCEDURE — 3288F FALL RISK ASSESSMENT DOCD: CPT | Mod: CPTII,S$GLB,, | Performed by: ORTHOPAEDIC SURGERY

## 2023-01-11 PROCEDURE — 1159F MED LIST DOCD IN RCRD: CPT | Mod: CPTII,S$GLB,, | Performed by: ORTHOPAEDIC SURGERY

## 2023-01-11 PROCEDURE — 3074F SYST BP LT 130 MM HG: CPT | Mod: CPTII,S$GLB,, | Performed by: ORTHOPAEDIC SURGERY

## 2023-01-11 PROCEDURE — 1125F AMNT PAIN NOTED PAIN PRSNT: CPT | Mod: CPTII,S$GLB,, | Performed by: ORTHOPAEDIC SURGERY

## 2023-01-11 PROCEDURE — 73552 X-RAY EXAM OF FEMUR 2/>: CPT | Mod: 26,LT,, | Performed by: RADIOLOGY

## 2023-01-11 PROCEDURE — 1159F PR MEDICATION LIST DOCUMENTED IN MEDICAL RECORD: ICD-10-PCS | Mod: CPTII,S$GLB,, | Performed by: ORTHOPAEDIC SURGERY

## 2023-01-11 PROCEDURE — 3078F DIAST BP <80 MM HG: CPT | Mod: CPTII,S$GLB,, | Performed by: ORTHOPAEDIC SURGERY

## 2023-01-11 PROCEDURE — 3078F PR MOST RECENT DIASTOLIC BLOOD PRESSURE < 80 MM HG: ICD-10-PCS | Mod: CPTII,S$GLB,, | Performed by: ORTHOPAEDIC SURGERY

## 2023-01-11 RX ORDER — ACETAMINOPHEN 500 MG
500 TABLET ORAL EVERY 6 HOURS PRN
COMMUNITY

## 2023-01-11 NOTE — LETTER
January 11, 2023    Ayde Felix  [address]  [facility] [address]             [facility]  Orthopedics  [address] [place] [address] DRIVE  [facility] [address]  Phone: [phone]  Fax: [phone]   January 11, 2023     Patient: Ayde Felix   YOB: 1950   Date of Visit: 1/11/2023       To Whom it May Concern:    Ayde Felix was seen in my clinic on 1/11/2023. She sustained a hip fracture in November of 2022.  She is still recovering from this injury.  Patient would greatly benefit from transitioning to a 1 bedroom apartment.  The smaller apartment will be easier for her to live in.  Patient will also benefit from living in place with either no stairs or access to an elevator.    If you have any questions or concerns, please don't hesitate to call.    Sincerely,         Antonella Montesinos MD, MYRTLE  Orthopaedic Surgeon

## 2023-01-11 NOTE — PROGRESS NOTES
Patient ID: Ayde Felix is a 72 y.o. female.    Chief Complaint: Pain and Post-op Evaluation of the Left Femur      HPI: Ayde Felix is a 72 y.o.  female who is here for follow-up of her left hip fracture.  She is 8 weeks status post cephalomedullary nailing of her left intertrochanteric hip fracture performed by Dr. Lynn on November 10, 2022. The patient was last seen by Dr. Lynn on December 14, 2022.  At that time, patient was progressing well.  She is accompanied today by her son.  She is ambulating with a rolling walker and going to outpatient therapy.  Her pain today is a 2/10.    No past medical history on file.  Past Surgical History:   Procedure Laterality Date    ORIF FEMUR FRACTURE Left 11/10/2022    Procedure: ORIF, FRACTURE, FEMUR;  Surgeon: eDni Lynn MD;  Location: [facility];  Service: Orthopedics;  Laterality: Left;     No family history on file.  Social History     Socioeconomic History    Marital status: Single   Tobacco Use    Smoking status: Every Day     Packs/day: 0.50     Types: Cigarettes    Smokeless tobacco: Never     Medication List with Changes/Refills   Current Medications    ACETAMINOPHEN (TYLENOL) 500 MG TABLET    Take 500 mg by mouth every 6 (six) hours as needed for Pain.    AMLODIPINE (NORVASC) 5 MG TABLET    Take 1 tablet (5 mg total) by mouth once daily.    ASPIRIN 81 MG CHEW    Take 1 tablet (81 mg total) by mouth 2 (two) times a day.    BUSPIRONE (BUSPAR) 10 MG TABLET    Take 1 tablet (10 mg total) by mouth 3 (three) times daily.    LORAZEPAM (ATIVAN) 0.5 MG TABLET    Take 1 tablet (0.5 mg total) by mouth 3 (three) times daily as needed for Anxiety.    MIRTAZAPINE (REMERON) 30 MG TABLET    Take 1 tablet (30 mg total) by mouth every evening.     Review of patient's allergies indicates:  No Known Allergies    Physical Exam:     Wt Readings from Last 3 Encounters:   01/11/23 45.4 kg (100 lb)   01/09/23 45.7 kg (100 lb 12 oz)   12/14/22 50.3 kg (111 lb)      Temp Readings from Last 3 Encounters:   11/17/22 99.2 °F (37.3 °C) (Oral)     BP Readings from Last 3 Encounters:   01/11/23 128/72   01/09/23 132/70   11/17/22 138/73     Pulse Readings from Last 3 Encounters:   01/11/23 79   01/09/23 69   11/17/22 93       Body mass index is 15.66 kg/m².      General Appearance:   cooperative, no acute distress, appropriate for age                    Neurologic:  Alert and oriented x3                            Pysch:  Appropriate mood and affect                              Head:  Normocephalic, atraumatic                             EENT:  EOM grossly intact, no icterus, moist mucous membranes, fair dentition                              Back:  Symmetrical, no curvature, ROM normal, no CVA tenderness                   Respiratory:  non-labored; no audible wheezing                      Abdomen:  non-distended           Musculoskeletal:  Patient is ambulating with a rolling walker; left lower extremity:  Patient has full range of motion of the left hip without pain.               Skin/Hair/Nails:  Skin warm, dry, and intact, no rashes or abnormal dyspigmentation     IMAGING:  X-rays today demonstrate a healing left intertrochanteric hip fracture without change in implant positioning.    Assessment:       Encounter Diagnoses   Name Primary?    Closed displaced intertrochanteric fracture of left femur with routine healing, subsequent encounter Yes    Tobacco use           Plan:       Ayde was seen today for pain and post-op evaluation.    Diagnoses and all orders for this visit:    Closed displaced intertrochanteric fracture of left femur with routine healing, subsequent encounter    Tobacco use        Overall patient is progressing very well.  She would like to continue to live alone here in [place].  Her son who lives in [place] would like her to move to [place] with him.  I did write a letter on the patient's behalf for her to be able to be transferred into a   bedroom apartment ideally on the 1st floor or with close access to a elevator to facilitate her ability to live independently.  In addition we completed a form for handed cap license permit.  Lastly, I discussed the importance of avoiding falling within the home and gave them information on resources on orthoinfo.org about fall prevention.  We will see her back in clinic in 4-6 weeks for recheck with x-rays.  At that time she should be completely healed and able to be discharged for follow-up.  We will also discussed with her at that time bone health optimization strategy such as verifying that she is had a bone density test and getting her referred over to the bone health clinic if not already done.    Follow up in about 4 weeks (around 2/8/2023) for X-rays of the left femur including AP pelvis.    The patient understands, chooses and consents to this plan and accepts all   the risks which include but are not limited to the risks mentioned above.     Disclaimer: This note was prepared using a voice recognition system and is likely to have sound alike errors within the text.         Antonella Montesinos MD, MYRTLE, FAAOS  Orthopaedic Surgeon

## 2023-01-17 ENCOUNTER — TELEPHONE (OUTPATIENT)
Dept: INTERNAL MEDICINE | Facility: CLINIC | Age: 73
End: 2023-01-17
Payer: MEDICARE

## 2023-01-17 NOTE — TELEPHONE ENCOUNTER
Called and spoke to patient about recent labs and results. Patient verbalized understanding with read back.

## 2023-01-18 DIAGNOSIS — Z78.0 MENOPAUSE: ICD-10-CM

## 2023-02-16 ENCOUNTER — TELEPHONE (OUTPATIENT)
Dept: INTERNAL MEDICINE | Facility: CLINIC | Age: 73
End: 2023-02-16
Payer: MEDICARE

## 2023-02-16 NOTE — TELEPHONE ENCOUNTER
Called pt, she stated for me to just cancel the appt and she will just reschedule herself when she goes back to [place]

## 2023-02-22 ENCOUNTER — OFFICE VISIT (OUTPATIENT)
Dept: ORTHOPEDICS | Facility: CLINIC | Age: 73
End: 2023-02-22
Payer: MEDICARE

## 2023-02-22 ENCOUNTER — HOSPITAL ENCOUNTER (OUTPATIENT)
Dept: RADIOLOGY | Facility: HOSPITAL | Age: 73
Discharge: HOME | End: 2023-02-22
Attending: ORTHOPAEDIC SURGERY
Payer: MEDICARE

## 2023-02-22 VITALS
BODY MASS INDEX: 15.7 KG/M2 | SYSTOLIC BLOOD PRESSURE: 119 MMHG | WEIGHT: 100 LBS | HEIGHT: 67 IN | DIASTOLIC BLOOD PRESSURE: 77 MMHG | HEART RATE: 92 BPM

## 2023-02-22 DIAGNOSIS — M54.50 LUMBAR SPINE PAIN: Primary | ICD-10-CM

## 2023-02-22 DIAGNOSIS — M54.16 ACUTE LEFT LUMBAR RADICULOPATHY: ICD-10-CM

## 2023-02-22 DIAGNOSIS — S72.142D CLOSED DISPLACED INTERTROCHANTERIC FRACTURE OF LEFT FEMUR WITH ROUTINE HEALING, SUBSEQUENT ENCOUNTER: Primary | ICD-10-CM

## 2023-02-22 DIAGNOSIS — R10.2 PAIN IN PELVIS: ICD-10-CM

## 2023-02-22 DIAGNOSIS — M89.8X5 PAIN OF LEFT FEMUR: ICD-10-CM

## 2023-02-22 PROCEDURE — 72170 X-RAY EXAM OF PELVIS: CPT | Mod: 26,,, | Performed by: RADIOLOGY

## 2023-02-22 PROCEDURE — 73552 X-RAY EXAM OF FEMUR 2/>: CPT | Mod: 26,LT,, | Performed by: RADIOLOGY

## 2023-02-22 PROCEDURE — 3044F HG A1C LEVEL LT 7.0%: CPT | Mod: CPTII,S$GLB,, | Performed by: ORTHOPAEDIC SURGERY

## 2023-02-22 PROCEDURE — 3078F DIAST BP <80 MM HG: CPT | Mod: CPTII,S$GLB,, | Performed by: ORTHOPAEDIC SURGERY

## 2023-02-22 PROCEDURE — 3008F BODY MASS INDEX DOCD: CPT | Mod: CPTII,S$GLB,, | Performed by: ORTHOPAEDIC SURGERY

## 2023-02-22 PROCEDURE — 1159F MED LIST DOCD IN RCRD: CPT | Mod: CPTII,S$GLB,, | Performed by: ORTHOPAEDIC SURGERY

## 2023-02-22 PROCEDURE — 73552 XR FEMUR 2 VIEW LEFT: ICD-10-PCS | Mod: 26,LT,, | Performed by: RADIOLOGY

## 2023-02-22 PROCEDURE — 72170 XR PELVIS ROUTINE AP: ICD-10-PCS | Mod: 26,,, | Performed by: RADIOLOGY

## 2023-02-22 PROCEDURE — 1125F AMNT PAIN NOTED PAIN PRSNT: CPT | Mod: CPTII,S$GLB,, | Performed by: ORTHOPAEDIC SURGERY

## 2023-02-22 PROCEDURE — 99213 OFFICE O/P EST LOW 20 MIN: CPT | Mod: S$GLB,,, | Performed by: ORTHOPAEDIC SURGERY

## 2023-02-22 PROCEDURE — 3074F SYST BP LT 130 MM HG: CPT | Mod: CPTII,S$GLB,, | Performed by: ORTHOPAEDIC SURGERY

## 2023-02-22 PROCEDURE — 3044F PR MOST RECENT HEMOGLOBIN A1C LEVEL <7.0%: ICD-10-PCS | Mod: CPTII,S$GLB,, | Performed by: ORTHOPAEDIC SURGERY

## 2023-02-22 PROCEDURE — 1100F PTFALLS ASSESS-DOCD GE2>/YR: CPT | Mod: CPTII,S$GLB,, | Performed by: ORTHOPAEDIC SURGERY

## 2023-02-22 PROCEDURE — 3074F PR MOST RECENT SYSTOLIC BLOOD PRESSURE < 130 MM HG: ICD-10-PCS | Mod: CPTII,S$GLB,, | Performed by: ORTHOPAEDIC SURGERY

## 2023-02-22 PROCEDURE — 99213 PR OFFICE/OUTPT VISIT, EST, LEVL III, 20-29 MIN: ICD-10-PCS | Mod: S$GLB,,, | Performed by: ORTHOPAEDIC SURGERY

## 2023-02-22 PROCEDURE — 1125F PR PAIN SEVERITY QUANTIFIED, PAIN PRESENT: ICD-10-PCS | Mod: CPTII,S$GLB,, | Performed by: ORTHOPAEDIC SURGERY

## 2023-02-22 PROCEDURE — 3288F FALL RISK ASSESSMENT DOCD: CPT | Mod: CPTII,S$GLB,, | Performed by: ORTHOPAEDIC SURGERY

## 2023-02-22 PROCEDURE — 99999 PR PBB SHADOW E&M-EST. PATIENT-LVL III: CPT | Mod: PBBFAC,,, | Performed by: ORTHOPAEDIC SURGERY

## 2023-02-22 PROCEDURE — 72170 X-RAY EXAM OF PELVIS: CPT | Mod: TC

## 2023-02-22 PROCEDURE — 3078F PR MOST RECENT DIASTOLIC BLOOD PRESSURE < 80 MM HG: ICD-10-PCS | Mod: CPTII,S$GLB,, | Performed by: ORTHOPAEDIC SURGERY

## 2023-02-22 PROCEDURE — 3008F PR BODY MASS INDEX (BMI) DOCUMENTED: ICD-10-PCS | Mod: CPTII,S$GLB,, | Performed by: ORTHOPAEDIC SURGERY

## 2023-02-22 PROCEDURE — 1100F PR PT FALLS ASSESS DOC 2+ FALLS/FALL W/INJURY/YR: ICD-10-PCS | Mod: CPTII,S$GLB,, | Performed by: ORTHOPAEDIC SURGERY

## 2023-02-22 PROCEDURE — 99999 PR PBB SHADOW E&M-EST. PATIENT-LVL III: ICD-10-PCS | Mod: PBBFAC,,, | Performed by: ORTHOPAEDIC SURGERY

## 2023-02-22 PROCEDURE — 1160F RVW MEDS BY RX/DR IN RCRD: CPT | Mod: CPTII,S$GLB,, | Performed by: ORTHOPAEDIC SURGERY

## 2023-02-22 PROCEDURE — 73552 X-RAY EXAM OF FEMUR 2/>: CPT | Mod: TC,LT

## 2023-02-22 PROCEDURE — 3288F PR FALLS RISK ASSESSMENT DOCUMENTED: ICD-10-PCS | Mod: CPTII,S$GLB,, | Performed by: ORTHOPAEDIC SURGERY

## 2023-02-22 PROCEDURE — 1159F PR MEDICATION LIST DOCUMENTED IN MEDICAL RECORD: ICD-10-PCS | Mod: CPTII,S$GLB,, | Performed by: ORTHOPAEDIC SURGERY

## 2023-02-22 PROCEDURE — 1160F PR REVIEW ALL MEDS BY PRESCRIBER/CLIN PHARMACIST DOCUMENTED: ICD-10-PCS | Mod: CPTII,S$GLB,, | Performed by: ORTHOPAEDIC SURGERY

## 2023-02-22 RX ORDER — NAPROXEN 500 MG/1
500 TABLET ORAL 2 TIMES DAILY WITH MEALS
Qty: 60 TABLET | Refills: 0 | Status: SHIPPED | OUTPATIENT
Start: 2023-02-22 | End: 2023-03-24

## 2023-02-22 NOTE — PROGRESS NOTES
Patient ID: Ayde Felix is a 73 y.o. female.    Chief Complaint: Pain and Post-op Evaluation of the Left Femur      HPI: Ayde Felix is a 73 y.o.  female who is here for follow-up of her left hip fracture.  She is 3 months status post cephalomedullary nailing of her left intertrochanteric hip fracture performed by Dr. Lynn on November 10, 2022. The patient was last seen by me on January 11, 2023.  Patient is progressing well and states her hip does fairly well however she does have some lower leg pain that comes and goes and when it is at its worst it is an 8/10.  She is still using her rolling walker and is going to physical therapy in [place], [place].     No past medical history on file.  Past Surgical History:   Procedure Laterality Date    ORIF FEMUR FRACTURE Left 11/10/2022    Procedure: ORIF, FRACTURE, FEMUR;  Surgeon: Deni Lynn MD;  Location: [facility];  Service: Orthopedics;  Laterality: Left;     No family history on file.  Social History     Socioeconomic History    Marital status: Single   Tobacco Use    Smoking status: Every Day     Packs/day: 0.50     Types: Cigarettes    Smokeless tobacco: Never     Medication List with Changes/Refills   New Medications    NAPROXEN (NAPROSYN) 500 MG TABLET    Take 1 tablet (500 mg total) by mouth 2 (two) times daily with meals.   Current Medications    ACETAMINOPHEN (TYLENOL) 500 MG TABLET    Take 500 mg by mouth every 6 (six) hours as needed for Pain.    AMLODIPINE (NORVASC) 5 MG TABLET    Take 1 tablet (5 mg total) by mouth once daily.    ASPIRIN 81 MG CHEW    Take 1 tablet (81 mg total) by mouth 2 (two) times a day.    BUSPIRONE (BUSPAR) 10 MG TABLET    Take 1 tablet (10 mg total) by mouth 3 (three) times daily.    LORAZEPAM (ATIVAN) 0.5 MG TABLET    TAKE 1 TABLET BY MOUTH THREE TIMES DAILY AS NEEDED FOR ANXIETY    MIRTAZAPINE (REMERON) 30 MG TABLET    Take 1 tablet (30 mg total) by mouth every evening.     Review of patient's allergies  indicates:  No Known Allergies    Physical Exam:     Wt Readings from Last 3 Encounters:   02/22/23 45.4 kg (100 lb)   01/11/23 45.4 kg (100 lb)   01/09/23 45.7 kg (100 lb 12 oz)     Temp Readings from Last 3 Encounters:   11/17/22 99.2 °F (37.3 °C) (Oral)     BP Readings from Last 3 Encounters:   02/22/23 119/77   01/11/23 128/72   01/09/23 132/70     Pulse Readings from Last 3 Encounters:   02/22/23 92   01/11/23 79   01/09/23 69       Body mass index is 15.66 kg/m².      General Appearance:   cooperative, no acute distress, appropriate for age                    Neurologic:  Alert and oriented x3                            Pysch:  Appropriate mood and affect                              Head:  Normocephalic, atraumatic                             EENT:  EOM grossly intact, no icterus, moist mucous membranes, fair dentition                              Back:  Symmetrical, no curvature, ROM normal, no CVA tenderness                   Respiratory:  non-labored; no audible wheezing                      Abdomen:  non-distended           Musculoskeletal:  Patient is ambulating with a rolling walker; left lower extremity:  No pain with full range of motion of the hip.               Skin/Hair/Nails:  Skin warm, dry, and intact, no rashes or abnormal dyspigmentation     IMAGING:  X-rays today demonstrate robust healing of left intertrochanteric hip fracture without change in implant positioning.  Review of prior AP pelvis films does show some mild degenerative changes in the lumbar spine.    Assessment:       Encounter Diagnoses   Name Primary?    Closed displaced intertrochanteric fracture of left femur with routine healing, subsequent encounter Yes    Acute left lumbar radiculopathy           Plan:       Ayde was seen today for pain and post-op evaluation.    Diagnoses and all orders for this visit:    Closed displaced intertrochanteric fracture of left femur with routine healing, subsequent encounter  -      Ambulatory referral/consult to Physical/Occupational Therapy; Future    Acute left lumbar radiculopathy  -     naproxen (NAPROSYN) 500 MG tablet; Take 1 tablet (500 mg total) by mouth 2 (two) times daily with meals.  -     Ambulatory referral/consult to Physical/Occupational Therapy; Future    Overall the patient is progressing very well from her left hip fracture.  It appears that most of her pain which she describes more in the lateral aspect of her lower leg and calf is more consistent with lumbar radiculopathy.  I have updated her physical therapy to include include core strengthening and modalities to help her back.  In addition I am starting her on naproxen to help with her pain.  We will see her back in approximately 6 weeks and we will get x-rays of her lumbar spine at that time.    Follow up in about 6 weeks (around 4/5/2023) for AP/Lat lumbar films.    The patient understands, chooses and consents to this plan and accepts all   the risks which include but are not limited to the risks mentioned above.     Disclaimer: This note was prepared using a voice recognition system and is likely to have sound alike errors within the text.         Antonella Montesinos MD, MYRTLE, FAAOS  Orthopaedic Surgeon

## 2023-03-29 ENCOUNTER — TELEPHONE (OUTPATIENT)
Dept: ORTHOPEDICS | Facility: CLINIC | Age: 73
End: 2023-03-29
Payer: MEDICARE

## 2023-03-29 NOTE — TELEPHONE ENCOUNTER
XR LUMBAR SPINE AP AND LATERAL  Received: Today  Evon Montesinos MD; CARLOS Berman Staff  Good Morning,     The auth for XR LUMBAR SPINE AP AND LATERAL will be DENIED due to the pts insurance ([id number]) being OON with the facility the pt is scheduled at. Im not sure if you are aware but [facility] DUAL PLANS are only in network with [facility], [facility], [facility], [facility], [facility], [facility], [facility], and [facility] when it comes to Outpatient Radiology Imaging. All other facilities are OON with this plan and there is no out of network benefits on plan. Please schedule the pt at an in network facility.     Thank You   Evon (Pre Service Rep)

## 2023-04-05 ENCOUNTER — OFFICE VISIT (OUTPATIENT)
Dept: ORTHOPEDICS | Facility: CLINIC | Age: 73
End: 2023-04-05
Payer: MEDICARE

## 2023-04-05 ENCOUNTER — HOSPITAL ENCOUNTER (OUTPATIENT)
Dept: RADIOLOGY | Facility: HOSPITAL | Age: 73
Discharge: HOME | End: 2023-04-05
Attending: ORTHOPAEDIC SURGERY
Payer: MEDICARE

## 2023-04-05 VITALS
HEART RATE: 82 BPM | SYSTOLIC BLOOD PRESSURE: 149 MMHG | DIASTOLIC BLOOD PRESSURE: 80 MMHG | WEIGHT: 100.06 LBS | HEIGHT: 61 IN | BODY MASS INDEX: 18.89 KG/M2

## 2023-04-05 DIAGNOSIS — M89.8X5 PAIN OF LEFT FEMUR: Primary | ICD-10-CM

## 2023-04-05 DIAGNOSIS — S72.142D CLOSED DISPLACED INTERTROCHANTERIC FRACTURE OF LEFT FEMUR WITH ROUTINE HEALING, SUBSEQUENT ENCOUNTER: Primary | ICD-10-CM

## 2023-04-05 DIAGNOSIS — M54.50 LUMBAR SPINE PAIN: ICD-10-CM

## 2023-04-05 PROCEDURE — 3079F PR MOST RECENT DIASTOLIC BLOOD PRESSURE 80-89 MM HG: ICD-10-PCS | Mod: CPTII,S$GLB,, | Performed by: PHYSICIAN ASSISTANT

## 2023-04-05 PROCEDURE — 3008F BODY MASS INDEX DOCD: CPT | Mod: CPTII,S$GLB,, | Performed by: PHYSICIAN ASSISTANT

## 2023-04-05 PROCEDURE — 1159F PR MEDICATION LIST DOCUMENTED IN MEDICAL RECORD: ICD-10-PCS | Mod: CPTII,S$GLB,, | Performed by: PHYSICIAN ASSISTANT

## 2023-04-05 PROCEDURE — 3044F HG A1C LEVEL LT 7.0%: CPT | Mod: CPTII,S$GLB,, | Performed by: PHYSICIAN ASSISTANT

## 2023-04-05 PROCEDURE — 99999 PR PBB SHADOW E&M-EST. PATIENT-LVL III: CPT | Mod: PBBFAC,,, | Performed by: PHYSICIAN ASSISTANT

## 2023-04-05 PROCEDURE — 3077F SYST BP >= 140 MM HG: CPT | Mod: CPTII,S$GLB,, | Performed by: PHYSICIAN ASSISTANT

## 2023-04-05 PROCEDURE — 1100F PR PT FALLS ASSESS DOC 2+ FALLS/FALL W/INJURY/YR: ICD-10-PCS | Mod: CPTII,S$GLB,, | Performed by: PHYSICIAN ASSISTANT

## 2023-04-05 PROCEDURE — 3079F DIAST BP 80-89 MM HG: CPT | Mod: CPTII,S$GLB,, | Performed by: PHYSICIAN ASSISTANT

## 2023-04-05 PROCEDURE — 1159F MED LIST DOCD IN RCRD: CPT | Mod: CPTII,S$GLB,, | Performed by: PHYSICIAN ASSISTANT

## 2023-04-05 PROCEDURE — 72100 X-RAY EXAM L-S SPINE 2/3 VWS: CPT | Mod: 26,,, | Performed by: RADIOLOGY

## 2023-04-05 PROCEDURE — 1125F PR PAIN SEVERITY QUANTIFIED, PAIN PRESENT: ICD-10-PCS | Mod: CPTII,S$GLB,, | Performed by: PHYSICIAN ASSISTANT

## 2023-04-05 PROCEDURE — 72100 X-RAY EXAM L-S SPINE 2/3 VWS: CPT | Mod: TC

## 2023-04-05 PROCEDURE — 1160F PR REVIEW ALL MEDS BY PRESCRIBER/CLIN PHARMACIST DOCUMENTED: ICD-10-PCS | Mod: CPTII,S$GLB,, | Performed by: PHYSICIAN ASSISTANT

## 2023-04-05 PROCEDURE — 99214 OFFICE O/P EST MOD 30 MIN: CPT | Mod: S$GLB,,, | Performed by: PHYSICIAN ASSISTANT

## 2023-04-05 PROCEDURE — 3077F PR MOST RECENT SYSTOLIC BLOOD PRESSURE >= 140 MM HG: ICD-10-PCS | Mod: CPTII,S$GLB,, | Performed by: PHYSICIAN ASSISTANT

## 2023-04-05 PROCEDURE — 1125F AMNT PAIN NOTED PAIN PRSNT: CPT | Mod: CPTII,S$GLB,, | Performed by: PHYSICIAN ASSISTANT

## 2023-04-05 PROCEDURE — 99214 PR OFFICE/OUTPT VISIT, EST, LEVL IV, 30-39 MIN: ICD-10-PCS | Mod: S$GLB,,, | Performed by: PHYSICIAN ASSISTANT

## 2023-04-05 PROCEDURE — 3008F PR BODY MASS INDEX (BMI) DOCUMENTED: ICD-10-PCS | Mod: CPTII,S$GLB,, | Performed by: PHYSICIAN ASSISTANT

## 2023-04-05 PROCEDURE — 1100F PTFALLS ASSESS-DOCD GE2>/YR: CPT | Mod: CPTII,S$GLB,, | Performed by: PHYSICIAN ASSISTANT

## 2023-04-05 PROCEDURE — 3044F PR MOST RECENT HEMOGLOBIN A1C LEVEL <7.0%: ICD-10-PCS | Mod: CPTII,S$GLB,, | Performed by: PHYSICIAN ASSISTANT

## 2023-04-05 PROCEDURE — 3288F PR FALLS RISK ASSESSMENT DOCUMENTED: ICD-10-PCS | Mod: CPTII,S$GLB,, | Performed by: PHYSICIAN ASSISTANT

## 2023-04-05 PROCEDURE — 99999 PR PBB SHADOW E&M-EST. PATIENT-LVL III: ICD-10-PCS | Mod: PBBFAC,,, | Performed by: PHYSICIAN ASSISTANT

## 2023-04-05 PROCEDURE — 72100 XR LUMBAR SPINE AP AND LATERAL: ICD-10-PCS | Mod: 26,,, | Performed by: RADIOLOGY

## 2023-04-05 PROCEDURE — 3288F FALL RISK ASSESSMENT DOCD: CPT | Mod: CPTII,S$GLB,, | Performed by: PHYSICIAN ASSISTANT

## 2023-04-05 PROCEDURE — 1160F RVW MEDS BY RX/DR IN RCRD: CPT | Mod: CPTII,S$GLB,, | Performed by: PHYSICIAN ASSISTANT

## 2023-04-05 NOTE — PROGRESS NOTES
"Subjective:      Patient ID: Ayde Felix is a 73 y.o. female.    Chief Complaint: Pain of the Left Femur      HPI: Ayde Felix is a 73-year-old female in clinic today for postoperative follow-up.  Patient is 4-1/2 months status post operative fixation of left intertrochanteric femur fracture with cephalomedullary nail.  She is doing well at this time.  She continues to gain strength.  She has no new complaints.    History reviewed. No pertinent past medical history.    Current Outpatient Medications:     acetaminophen (TYLENOL) 500 MG tablet, Take 500 mg by mouth every 6 (six) hours as needed for Pain., Disp: , Rfl:     amLODIPine (NORVASC) 5 MG tablet, Take 1 tablet (5 mg total) by mouth once daily., Disp: 30 tablet, Rfl: 11    aspirin 81 MG Chew, Take 1 tablet (81 mg total) by mouth 2 (two) times a day., Disp: 60 tablet, Rfl: 11    busPIRone (BUSPAR) 10 MG tablet, Take 1 tablet (10 mg total) by mouth 3 (three) times daily., Disp: 90 tablet, Rfl: 11    [START ON 4/7/2023] LORazepam (ATIVAN) 0.5 MG tablet, TAKE 1 TABLET BY MOUTH THREE TIMES DAILY AS NEEDED FOR ANXIETY, Disp: 90 tablet, Rfl: 0    mirtazapine (REMERON) 30 MG tablet, Take 1 tablet (30 mg total) by mouth every evening., Disp: 30 tablet, Rfl: 11  Review of patient's allergies indicates:  No Known Allergies    BP (!) 149/80 (BP Location: Left arm, Patient Position: Sitting, BP Method: Medium (Automatic))   Pulse 82   Ht 5' 1" (1.549 m)   Wt 45.4 kg (100 lb 1.4 oz)   BMI 18.91 kg/m²     ROS      Objective:    Ortho Exam   Left lower extremity:  Incision well healed, no signs of infection  No edema  No TTP  No pain with internal/external rotation  Hip ROM normal   Calf and compartments are soft and compressible  Motor exam normal   Sensation and pulses intact    GEN: Well developed, well nourished female. AAOX3. No acute distress.   Head: Normocephalic, atraumatic.   Eyes: RIP  Neck: Trachea is midline, no adenopathy  Resp: Breathing " unlabored.  Neuro: Motor function normal, Cranial nerves intact  Psych: Mood and affect appropriate.       Assessment:     Imaging:  X-ray of the lumbar spine was obtained today which shows no acute fracture dislocation, but there are degenerative changes.        1. Closed displaced intertrochanteric fracture of left femur with routine healing, subsequent encounter          Plan:       Reviewed the radiographs with the patient.  Encouraged patient on the progress she is made so far following her hip fracture and surgery.  Patient was encouraged to continue to increase all activities as tolerated.  She was cautioned on fall avoidance.  Patient will return to this clinic in about 6 weeks for repeat radiographs and further evaluation, if necessary.       Follow up in about 6 weeks (around 5/17/2023).          Patient note was created using [employer] Dictation.  Any errors in syntax or even information may not have been identified and edited on initial review prior to signing this note.

## 2024-01-24 DIAGNOSIS — Z78.0 MENOPAUSE: ICD-10-CM

## 2024-03-26 ENCOUNTER — PATIENT MESSAGE (OUTPATIENT)
Dept: RESEARCH | Facility: HOSPITAL | Age: 74
End: 2024-03-26
Payer: MEDICARE

## (undated) DEVICE — DECANTER 6 VIAL

## (undated) DEVICE — PACK BASIC SETUP SC BR

## (undated) DEVICE — DRAPE MOBILE C-ARM

## (undated) DEVICE — DRAPE VERTICAL ISOLATION

## (undated) DEVICE — HEADREST ROUND DISP FOAM 9IN

## (undated) DEVICE — DRAPE U SPLIT SHEET 54X76IN

## (undated) DEVICE — TUBING MEDI-VAC 20FT .25IN

## (undated) DEVICE — DRAPE T EXTRM SURG 121X128X90

## (undated) DEVICE — KIT PT CARE HANA PROFX SSXT

## (undated) DEVICE — APPLICATOR CHLORAPREP ORN 26ML

## (undated) DEVICE — Device

## (undated) DEVICE — STAPLER SKIN PROXIMATE WIDE

## (undated) DEVICE — GOWN POLY REINF BRTH SLV XL

## (undated) DEVICE — DRAPE IOBAN 2 STERI

## (undated) DEVICE — ELECTRODE REM PLYHSV RETURN 9

## (undated) DEVICE — TOWEL OR DISP STRL BLUE 4/PK

## (undated) DEVICE — MANIFOLD 4 PORT

## (undated) DEVICE — SUT VICRYL 2-0 27 CT-1

## (undated) DEVICE — SUT 0 VICRYL / CT-1

## (undated) DEVICE — GLOVE SURG BIOGEL LATEX SZ 7.5

## (undated) DEVICE — DRESSING XEROFORM FOIL PK 1X8

## (undated) DEVICE — COVER LIGHT HANDLE 80/CA

## (undated) DEVICE — SUPPORT ULNA NERVE PROTECTOR

## (undated) DEVICE — ALCOHOL 70% ISOP RUBBING 4OZ

## (undated) DEVICE — GAUZE SPONGE 4X4 12PLY

## (undated) DEVICE — GLOVE BIOGEL PI ORTHO PRO SZ7

## (undated) DEVICE — IMPLANTABLE DEVICE
Type: IMPLANTABLE DEVICE | Site: HIP | Status: NON-FUNCTIONAL
Removed: 2022-11-10

## (undated) DEVICE — ACTIVATION TOOL